# Patient Record
Sex: MALE | Race: WHITE | Employment: OTHER | URBAN - METROPOLITAN AREA
[De-identification: names, ages, dates, MRNs, and addresses within clinical notes are randomized per-mention and may not be internally consistent; named-entity substitution may affect disease eponyms.]

---

## 2017-09-08 ENCOUNTER — LAB ENCOUNTER (OUTPATIENT)
Dept: LAB | Age: 77
End: 2017-09-08
Attending: FAMILY MEDICINE
Payer: MEDICARE

## 2017-09-08 DIAGNOSIS — E78.2 MIXED HYPERLIPIDEMIA: Primary | ICD-10-CM

## 2017-09-08 DIAGNOSIS — I10 ESSENTIAL HYPERTENSION, MALIGNANT: ICD-10-CM

## 2017-09-08 LAB
ALBUMIN SERPL-MCNC: 4 G/DL (ref 3.5–4.8)
ALP LIVER SERPL-CCNC: 100 U/L (ref 45–117)
ALT SERPL-CCNC: 29 U/L (ref 17–63)
AST SERPL-CCNC: 18 U/L (ref 15–41)
BILIRUB SERPL-MCNC: 0.7 MG/DL (ref 0.1–2)
BUN BLD-MCNC: 19 MG/DL (ref 8–20)
CALCIUM BLD-MCNC: 9.4 MG/DL (ref 8.3–10.3)
CHLORIDE: 104 MMOL/L (ref 101–111)
CHOLEST SMN-MCNC: 165 MG/DL (ref ?–200)
CO2: 28 MMOL/L (ref 22–32)
CREAT BLD-MCNC: 0.94 MG/DL (ref 0.7–1.3)
CREAT UR-SCNC: 188 MG/DL
GLUCOSE BLD-MCNC: 77 MG/DL (ref 70–99)
HDLC SERPL-MCNC: 61 MG/DL (ref 45–?)
HDLC SERPL: 2.7 {RATIO} (ref ?–4.97)
LDLC SERPL CALC-MCNC: 94 MG/DL (ref ?–130)
LDLC SERPL-MCNC: 10 MG/DL (ref 5–40)
M PROTEIN MFR SERPL ELPH: 7.4 G/DL (ref 6.1–8.3)
MICROALBUMIN UR-MCNC: 2.33 MG/DL
MICROALBUMIN/CREAT 24H UR-RTO: 12.4 UG/MG (ref ?–30)
NONHDLC SERPL-MCNC: 104 MG/DL (ref ?–130)
POTASSIUM SERPL-SCNC: 4.1 MMOL/L (ref 3.6–5.1)
SODIUM SERPL-SCNC: 138 MMOL/L (ref 136–144)
TRIGLYCERIDES: 52 MG/DL (ref ?–150)

## 2017-09-08 PROCEDURE — 80053 COMPREHEN METABOLIC PANEL: CPT

## 2017-09-08 PROCEDURE — 82043 UR ALBUMIN QUANTITATIVE: CPT

## 2017-09-08 PROCEDURE — 36415 COLL VENOUS BLD VENIPUNCTURE: CPT

## 2017-09-08 PROCEDURE — 82570 ASSAY OF URINE CREATININE: CPT

## 2017-09-08 PROCEDURE — 80061 LIPID PANEL: CPT

## 2023-01-14 ENCOUNTER — OFFICE VISIT (OUTPATIENT)
Dept: URGENT CARE | Age: 83
End: 2023-01-14

## 2023-01-14 DIAGNOSIS — H61.23 BILATERAL IMPACTED CERUMEN: Primary | ICD-10-CM

## 2023-01-14 PROCEDURE — 99203 OFFICE O/P NEW LOW 30 MIN: CPT | Performed by: NURSE PRACTITIONER

## 2023-01-14 RX ORDER — ATORVASTATIN CALCIUM 40 MG/1
40 TABLET, FILM COATED ORAL DAILY
COMMUNITY
Start: 2022-10-17

## 2023-01-14 RX ORDER — ESCITALOPRAM OXALATE 10 MG/1
10 TABLET ORAL DAILY
COMMUNITY
Start: 2022-11-15

## 2023-01-14 RX ORDER — LOSARTAN POTASSIUM 50 MG/1
TABLET ORAL
COMMUNITY
Start: 2022-12-15

## 2023-01-14 RX ORDER — ALPRAZOLAM 0.25 MG/1
TABLET ORAL
COMMUNITY
Start: 2022-08-27

## 2023-01-21 ENCOUNTER — OFFICE VISIT (OUTPATIENT)
Dept: URGENT CARE | Age: 83
End: 2023-01-21

## 2023-01-21 DIAGNOSIS — H61.23 BILATERAL IMPACTED CERUMEN: Primary | ICD-10-CM

## 2023-01-21 PROCEDURE — 69210 REMOVE IMPACTED EAR WAX UNI: CPT | Performed by: NURSE PRACTITIONER

## 2023-01-21 PROCEDURE — 69209 REMOVE IMPACTED EAR WAX UNI: CPT | Performed by: NURSE PRACTITIONER

## 2023-05-30 ENCOUNTER — HOSPITAL ENCOUNTER (EMERGENCY)
Facility: HOSPITAL | Age: 83
Discharge: HOME | End: 2023-05-30
Attending: STUDENT IN AN ORGANIZED HEALTH CARE EDUCATION/TRAINING PROGRAM
Payer: MEDICARE

## 2023-05-30 ENCOUNTER — APPOINTMENT (OUTPATIENT)
Dept: GENERAL RADIOLOGY | Facility: HOSPITAL | Age: 83
End: 2023-05-30
Attending: STUDENT IN AN ORGANIZED HEALTH CARE EDUCATION/TRAINING PROGRAM
Payer: MEDICARE

## 2023-05-30 VITALS
HEART RATE: 54 BPM | BODY MASS INDEX: 23.84 KG/M2 | HEIGHT: 72 IN | RESPIRATION RATE: 15 BRPM | OXYGEN SATURATION: 98 % | DIASTOLIC BLOOD PRESSURE: 60 MMHG | SYSTOLIC BLOOD PRESSURE: 125 MMHG | WEIGHT: 176 LBS | TEMPERATURE: 97 F

## 2023-05-30 DIAGNOSIS — I50.9 CONGESTIVE HEART FAILURE, UNSPECIFIED HF CHRONICITY, UNSPECIFIED HEART FAILURE TYPE (HCC): Primary | ICD-10-CM

## 2023-05-30 LAB
ANION GAP SERPL CALC-SCNC: 5 MMOL/L (ref 0–18)
BASOPHILS # BLD AUTO: 0.02 X10(3) UL (ref 0–0.2)
BASOPHILS NFR BLD AUTO: 0.2 %
BUN BLD-MCNC: 34 MG/DL (ref 7–18)
CALCIUM BLD-MCNC: 8.8 MG/DL (ref 8.5–10.1)
CHLORIDE SERPL-SCNC: 104 MMOL/L (ref 98–112)
CO2 SERPL-SCNC: 28 MMOL/L (ref 21–32)
CREAT BLD-MCNC: 1.09 MG/DL
EOSINOPHIL # BLD AUTO: 0.37 X10(3) UL (ref 0–0.7)
EOSINOPHIL NFR BLD AUTO: 3.1 %
ERYTHROCYTE [DISTWIDTH] IN BLOOD BY AUTOMATED COUNT: 12.6 %
GFR SERPLBLD BASED ON 1.73 SQ M-ARVRAT: 68 ML/MIN/1.73M2 (ref 60–?)
GLUCOSE BLD-MCNC: 97 MG/DL (ref 70–99)
HCT VFR BLD AUTO: 41.1 %
HGB BLD-MCNC: 14 G/DL
IMM GRANULOCYTES # BLD AUTO: 0.04 X10(3) UL (ref 0–1)
IMM GRANULOCYTES NFR BLD: 0.3 %
LYMPHOCYTES # BLD AUTO: 3.1 X10(3) UL (ref 1–4)
LYMPHOCYTES NFR BLD AUTO: 26 %
MCH RBC QN AUTO: 29.3 PG (ref 26–34)
MCHC RBC AUTO-ENTMCNC: 34.1 G/DL (ref 31–37)
MCV RBC AUTO: 86 FL
MONOCYTES # BLD AUTO: 1.02 X10(3) UL (ref 0.1–1)
MONOCYTES NFR BLD AUTO: 8.5 %
NEUTROPHILS # BLD AUTO: 7.39 X10 (3) UL (ref 1.5–7.7)
NEUTROPHILS # BLD AUTO: 7.39 X10(3) UL (ref 1.5–7.7)
NEUTROPHILS NFR BLD AUTO: 61.9 %
NT-PROBNP SERPL-MCNC: 255 PG/ML (ref ?–450)
OSMOLALITY SERPL CALC.SUM OF ELEC: 292 MOSM/KG (ref 275–295)
PLATELET # BLD AUTO: 247 10(3)UL (ref 150–450)
POTASSIUM SERPL-SCNC: 4.1 MMOL/L (ref 3.5–5.1)
RBC # BLD AUTO: 4.78 X10(6)UL
SODIUM SERPL-SCNC: 137 MMOL/L (ref 136–145)
TROPONIN I HIGH SENSITIVITY: 6 NG/L
WBC # BLD AUTO: 11.9 X10(3) UL (ref 4–11)

## 2023-05-30 PROCEDURE — 99284 EMERGENCY DEPT VISIT MOD MDM: CPT

## 2023-05-30 PROCEDURE — 93010 ELECTROCARDIOGRAM REPORT: CPT

## 2023-05-30 PROCEDURE — 36415 COLL VENOUS BLD VENIPUNCTURE: CPT

## 2023-05-30 PROCEDURE — 80048 BASIC METABOLIC PNL TOTAL CA: CPT | Performed by: STUDENT IN AN ORGANIZED HEALTH CARE EDUCATION/TRAINING PROGRAM

## 2023-05-30 PROCEDURE — 84484 ASSAY OF TROPONIN QUANT: CPT | Performed by: STUDENT IN AN ORGANIZED HEALTH CARE EDUCATION/TRAINING PROGRAM

## 2023-05-30 PROCEDURE — 71045 X-RAY EXAM CHEST 1 VIEW: CPT | Performed by: STUDENT IN AN ORGANIZED HEALTH CARE EDUCATION/TRAINING PROGRAM

## 2023-05-30 PROCEDURE — 93005 ELECTROCARDIOGRAM TRACING: CPT

## 2023-05-30 PROCEDURE — 85025 COMPLETE CBC W/AUTO DIFF WBC: CPT | Performed by: STUDENT IN AN ORGANIZED HEALTH CARE EDUCATION/TRAINING PROGRAM

## 2023-05-30 PROCEDURE — 83880 ASSAY OF NATRIURETIC PEPTIDE: CPT | Performed by: STUDENT IN AN ORGANIZED HEALTH CARE EDUCATION/TRAINING PROGRAM

## 2023-05-30 PROCEDURE — 99285 EMERGENCY DEPT VISIT HI MDM: CPT

## 2023-05-30 NOTE — ED INITIAL ASSESSMENT (HPI)
Pt. States he has been having issues breathing with exertion for the past 3 months, has been to a few different doctors with conflicting opinions.  Also c/o swelling and redness to BLE and dizziness

## 2023-05-31 LAB
ATRIAL RATE: 54 BPM
P AXIS: 70 DEGREES
P-R INTERVAL: 192 MS
Q-T INTERVAL: 444 MS
QRS DURATION: 106 MS
QTC CALCULATION (BEZET): 421 MS
R AXIS: -6 DEGREES
T AXIS: 29 DEGREES
VENTRICULAR RATE: 54 BPM

## 2023-05-31 NOTE — ED QUICK NOTES
Rounding Completed    Plan of Care reviewed. Waiting for CXR results. Elimination needs assessed. Provided cup of water per ok from [facility].    Bed is locked and in lowest position. Call light within reach.

## 2024-05-31 ENCOUNTER — APPOINTMENT (OUTPATIENT)
Dept: GENERAL RADIOLOGY | Age: 84
DRG: 956 | End: 2024-05-31
Attending: SURGERY

## 2024-05-31 ENCOUNTER — HOSPITAL ENCOUNTER (INPATIENT)
Age: 84
DRG: 956 | End: 2024-05-31
Attending: SURGERY | Admitting: SURGERY

## 2024-05-31 ENCOUNTER — APPOINTMENT (OUTPATIENT)
Dept: CT IMAGING | Age: 84
DRG: 956 | End: 2024-05-31

## 2024-05-31 ENCOUNTER — APPOINTMENT (OUTPATIENT)
Dept: CT IMAGING | Age: 84
DRG: 956 | End: 2024-05-31
Attending: SURGERY

## 2024-05-31 ENCOUNTER — APPOINTMENT (OUTPATIENT)
Dept: GENERAL RADIOLOGY | Age: 84
DRG: 956 | End: 2024-05-31

## 2024-05-31 ENCOUNTER — ANESTHESIA EVENT (OUTPATIENT)
Dept: SURGERY | Age: 84
End: 2024-05-31

## 2024-05-31 DIAGNOSIS — S72.8X2A OTHER FRACTURE OF LEFT FEMUR, INITIAL ENCOUNTER FOR CLOSED FRACTURE  (CMD): Primary | ICD-10-CM

## 2024-05-31 DIAGNOSIS — I47.10 SVT (SUPRAVENTRICULAR TACHYCARDIA) (CMD): ICD-10-CM
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
## 2024-05-31 LAB
ABO + RH BLD: NORMAL
ABO + RH BLD: NORMAL
ALBUMIN SERPL-MCNC: 3.7 G/DL (ref 3.6–5.1)
ALBUMIN SERPL-MCNC: 3.7 G/DL (ref 3.6–5.1)
ALBUMIN/GLOB SERPL: 1.1 {RATIO} (ref 1–2.4)
ALBUMIN/GLOB SERPL: 1.1 {RATIO} (ref 1–2.4)
ALP SERPL-CCNC: 101 UNITS/L (ref 45–117)
ALP SERPL-CCNC: 101 UNITS/L (ref 45–117)
ALT SERPL-CCNC: 27 UNITS/L
ALT SERPL-CCNC: 27 UNITS/L
AMPHETAMINES UR QL SCN>500 NG/ML: NEGATIVE
AMPHETAMINES UR QL SCN>500 NG/ML: NEGATIVE
AMYLASE SERPL-CCNC: 92 UNITS/L (ref 25–115)
AMYLASE SERPL-CCNC: 92 UNITS/L (ref 25–115)
ANION GAP SERPL CALC-SCNC: 11 MMOL/L (ref 7–19)
ANION GAP SERPL CALC-SCNC: 11 MMOL/L (ref 7–19)
APPEARANCE UR: CLEAR
APPEARANCE UR: CLEAR
APTT PPP: 23 SEC (ref 22–32)
APTT PPP: 23 SEC (ref 22–32)
AST SERPL-CCNC: 31 UNITS/L
AST SERPL-CCNC: 31 UNITS/L
BARBITURATES UR QL SCN>200 NG/ML: NEGATIVE
BARBITURATES UR QL SCN>200 NG/ML: NEGATIVE
BASOPHILS # BLD: 0 K/MCL (ref 0–0.3)
BASOPHILS # BLD: 0 K/MCL (ref 0–0.3)
BASOPHILS NFR BLD: 0 %
BASOPHILS NFR BLD: 0 %
BENZODIAZ UR QL SCN>200 NG/ML: NEGATIVE
BENZODIAZ UR QL SCN>200 NG/ML: NEGATIVE
BILIRUB SERPL-MCNC: 0.7 MG/DL (ref 0.2–1)
BILIRUB SERPL-MCNC: 0.7 MG/DL (ref 0.2–1)
BILIRUB UR QL STRIP: NEGATIVE
BILIRUB UR QL STRIP: NEGATIVE
BLD GP AB SCN SERPL QL GEL: NEGATIVE
BLD GP AB SCN SERPL QL GEL: NEGATIVE
BUN SERPL-MCNC: 27 MG/DL (ref 6–20)
BUN SERPL-MCNC: 27 MG/DL (ref 6–20)
BUN/CREAT SERPL: 23 (ref 7–25)
BUN/CREAT SERPL: 23 (ref 7–25)
BZE UR QL SCN>150 NG/ML: NEGATIVE
BZE UR QL SCN>150 NG/ML: NEGATIVE
CALCIUM SERPL-MCNC: 9 MG/DL (ref 8.4–10.2)
CALCIUM SERPL-MCNC: 9 MG/DL (ref 8.4–10.2)
CANNABINOIDS UR QL SCN>50 NG/ML: NEGATIVE
CANNABINOIDS UR QL SCN>50 NG/ML: NEGATIVE
CHLORIDE SERPL-SCNC: 106 MMOL/L (ref 97–110)
CHLORIDE SERPL-SCNC: 106 MMOL/L (ref 97–110)
CK SERPL-CCNC: 195 UNITS/L (ref 39–308)
CK SERPL-CCNC: 195 UNITS/L (ref 39–308)
CO2 SERPL-SCNC: 24 MMOL/L (ref 21–32)
CO2 SERPL-SCNC: 24 MMOL/L (ref 21–32)
COLOR UR: COLORLESS
COLOR UR: COLORLESS
CREAT SERPL-MCNC: 1.19 MG/DL (ref 0.67–1.17)
CREAT SERPL-MCNC: 1.19 MG/DL (ref 0.67–1.17)
DEPRECATED RDW RBC: 41.7 FL (ref 39–50)
DEPRECATED RDW RBC: 41.7 FL (ref 39–50)
EGFRCR SERPLBLD CKD-EPI 2021: 47 ML/MIN/{1.73_M2}
EGFRCR SERPLBLD CKD-EPI 2021: 47 ML/MIN/{1.73_M2}
EOSINOPHIL # BLD: 0.1 K/MCL (ref 0–0.5)
EOSINOPHIL # BLD: 0.1 K/MCL (ref 0–0.5)
EOSINOPHIL NFR BLD: 1 %
EOSINOPHIL NFR BLD: 1 %
ERYTHROCYTE [DISTWIDTH] IN BLOOD: 12.7 % (ref 11–15)
ERYTHROCYTE [DISTWIDTH] IN BLOOD: 12.7 % (ref 11–15)
ETHANOL SERPL-MCNC: NORMAL MG/DL
ETHANOL SERPL-MCNC: NORMAL MG/DL
FASTING DURATION TIME PATIENT: ABNORMAL H
FASTING DURATION TIME PATIENT: ABNORMAL H
FENTANYL UR QL SCN: NEGATIVE
FENTANYL UR QL SCN: NEGATIVE
GLOBULIN SER-MCNC: 3.5 G/DL (ref 2–4)
GLOBULIN SER-MCNC: 3.5 G/DL (ref 2–4)
GLUCOSE SERPL-MCNC: 108 MG/DL (ref 70–99)
GLUCOSE SERPL-MCNC: 108 MG/DL (ref 70–99)
GLUCOSE UR STRIP-MCNC: NEGATIVE MG/DL
GLUCOSE UR STRIP-MCNC: NEGATIVE MG/DL
HCT VFR BLD CALC: 39.8 % (ref 39–51)
HCT VFR BLD CALC: 39.8 % (ref 39–51)
HGB BLD-MCNC: 13.2 G/DL (ref 13–17)
HGB BLD-MCNC: 13.2 G/DL (ref 13–17)
HGB UR QL STRIP: NEGATIVE
HGB UR QL STRIP: NEGATIVE
IMM GRANULOCYTES # BLD AUTO: 0 K/MCL (ref 0–0.2)
IMM GRANULOCYTES # BLD AUTO: 0 K/MCL (ref 0–0.2)
IMM GRANULOCYTES # BLD: 0 %
IMM GRANULOCYTES # BLD: 0 %
INR PPP: 1
INR PPP: 1
KETONES UR STRIP-MCNC: NEGATIVE MG/DL
KETONES UR STRIP-MCNC: NEGATIVE MG/DL
LEUKOCYTE ESTERASE UR QL STRIP: NEGATIVE
LEUKOCYTE ESTERASE UR QL STRIP: NEGATIVE
LIPASE SERPL-CCNC: 37 UNITS/L (ref 15–77)
LIPASE SERPL-CCNC: 37 UNITS/L (ref 15–77)
LYMPHOCYTES # BLD: 3.9 K/MCL (ref 1–4)
LYMPHOCYTES # BLD: 3.9 K/MCL (ref 1–4)
LYMPHOCYTES NFR BLD: 38 %
LYMPHOCYTES NFR BLD: 38 %
MCH RBC QN AUTO: 29.8 PG (ref 26–34)
MCH RBC QN AUTO: 29.8 PG (ref 26–34)
MCHC RBC AUTO-ENTMCNC: 33.2 G/DL (ref 32–36.5)
MCHC RBC AUTO-ENTMCNC: 33.2 G/DL (ref 32–36.5)
MCV RBC AUTO: 89.8 FL (ref 78–100)
MCV RBC AUTO: 89.8 FL (ref 78–100)
MONOCYTES # BLD: 0.8 K/MCL (ref 0.3–0.9)
MONOCYTES # BLD: 0.8 K/MCL (ref 0.3–0.9)
MONOCYTES NFR BLD: 8 %
MONOCYTES NFR BLD: 8 %
NEUTROPHILS # BLD: 5.5 K/MCL (ref 1.8–7.7)
NEUTROPHILS # BLD: 5.5 K/MCL (ref 1.8–7.7)
NEUTROPHILS NFR BLD: 53 %
NEUTROPHILS NFR BLD: 53 %
NITRITE UR QL STRIP: NEGATIVE
NITRITE UR QL STRIP: NEGATIVE
NRBC BLD MANUAL-RTO: 0 /100 WBC
NRBC BLD MANUAL-RTO: 0 /100 WBC
OPIATES UR QL SCN>300 NG/ML: NEGATIVE
OPIATES UR QL SCN>300 NG/ML: NEGATIVE
PCP UR QL SCN>25 NG/ML: NEGATIVE
PCP UR QL SCN>25 NG/ML: NEGATIVE
PH UR STRIP: 6.5 [PH] (ref 5–7)
PH UR STRIP: 6.5 [PH] (ref 5–7)
PLATELET # BLD AUTO: 274 K/MCL (ref 140–450)
PLATELET # BLD AUTO: 274 K/MCL (ref 140–450)
POTASSIUM SERPL-SCNC: 4.2 MMOL/L (ref 3.4–5.1)
POTASSIUM SERPL-SCNC: 4.2 MMOL/L (ref 3.4–5.1)
PROT SERPL-MCNC: 7.2 G/DL (ref 6.4–8.2)
PROT SERPL-MCNC: 7.2 G/DL (ref 6.4–8.2)
PROT UR STRIP-MCNC: NEGATIVE MG/DL
PROT UR STRIP-MCNC: NEGATIVE MG/DL
PROTHROMBIN TIME: 11 SEC (ref 9.7–11.8)
PROTHROMBIN TIME: 11 SEC (ref 9.7–11.8)
RAINBOW EXTRA TUBES HOLD SPECIMEN: NORMAL
RBC # BLD: 4.43 MIL/MCL (ref 4.5–5.9)
RBC # BLD: 4.43 MIL/MCL (ref 4.5–5.9)
SODIUM SERPL-SCNC: 137 MMOL/L (ref 135–145)
SODIUM SERPL-SCNC: 137 MMOL/L (ref 135–145)
SP GR UR STRIP: 1.02 (ref 1–1.03)
SP GR UR STRIP: 1.02 (ref 1–1.03)
TROPONIN I SERPL DL<=0.01 NG/ML-MCNC: 6 NG/L
TROPONIN I SERPL DL<=0.01 NG/ML-MCNC: 6 NG/L
TYPE AND SCREEN EXPIRATION DATE: NORMAL
TYPE AND SCREEN EXPIRATION DATE: NORMAL
UROBILINOGEN UR STRIP-MCNC: 0.2 MG/DL
UROBILINOGEN UR STRIP-MCNC: 0.2 MG/DL
WBC # BLD: 10.4 K/MCL (ref 4.2–11)
WBC # BLD: 10.4 K/MCL (ref 4.2–11)

## 2024-05-31 PROCEDURE — 10004651 HB RX, NO CHARGE ITEM: Performed by: SURGERY

## 2024-05-31 PROCEDURE — 10006031 HB ROOM CHARGE TELEMETRY

## 2024-05-31 PROCEDURE — 82150 ASSAY OF AMYLASE: CPT

## 2024-05-31 PROCEDURE — 96372 THER/PROPH/DIAG INJ SC/IM: CPT

## 2024-05-31 PROCEDURE — 10002800 HB RX 250 W HCPCS

## 2024-05-31 PROCEDURE — 12001 RPR S/N/AX/GEN/TRNK 2.5CM/<: CPT | Performed by: EMERGENCY MEDICINE

## 2024-05-31 PROCEDURE — 80307 DRUG TEST PRSMV CHEM ANLYZR: CPT

## 2024-05-31 PROCEDURE — 73700 CT LOWER EXTREMITY W/O DYE: CPT

## 2024-05-31 PROCEDURE — 83690 ASSAY OF LIPASE: CPT

## 2024-05-31 PROCEDURE — 10003579 HB TRAUMA W/O CRITICAL CARE

## 2024-05-31 PROCEDURE — 73551 X-RAY EXAM OF FEMUR 1: CPT

## 2024-05-31 PROCEDURE — 82550 ASSAY OF CK (CPK): CPT

## 2024-05-31 PROCEDURE — 96375 TX/PRO/DX INJ NEW DRUG ADDON: CPT

## 2024-05-31 PROCEDURE — 85610 PROTHROMBIN TIME: CPT

## 2024-05-31 PROCEDURE — 13001086 HB INCENTIVE SPIROMETER W INSTRUCT

## 2024-05-31 PROCEDURE — 90715 TDAP VACCINE 7 YRS/> IM: CPT | Performed by: SURGERY

## 2024-05-31 PROCEDURE — 71045 X-RAY EXAM CHEST 1 VIEW: CPT

## 2024-05-31 PROCEDURE — 99291 CRITICAL CARE FIRST HOUR: CPT | Performed by: SURGERY

## 2024-05-31 PROCEDURE — 96374 THER/PROPH/DIAG INJ IV PUSH: CPT

## 2024-05-31 PROCEDURE — 74177 CT ABD & PELVIS W/CONTRAST: CPT

## 2024-05-31 PROCEDURE — 10002805 HB CONTRAST AGENT

## 2024-05-31 PROCEDURE — 99285 EMERGENCY DEPT VISIT HI MDM: CPT

## 2024-05-31 PROCEDURE — 10002803 HB RX 637: Performed by: SURGERY

## 2024-05-31 PROCEDURE — 72125 CT NECK SPINE W/O DYE: CPT

## 2024-05-31 PROCEDURE — 10002800 HB RX 250 W HCPCS: Performed by: SURGERY

## 2024-05-31 PROCEDURE — 86900 BLOOD TYPING SEROLOGIC ABO: CPT | Performed by: PATHOLOGY

## 2024-05-31 PROCEDURE — 10005281 CT THORACIC SPINE 2D REFORMATTED

## 2024-05-31 PROCEDURE — 99222 1ST HOSP IP/OBS MODERATE 55: CPT | Performed by: SURGERY

## 2024-05-31 PROCEDURE — 81003 URINALYSIS AUTO W/O SCOPE: CPT | Performed by: SURGERY

## 2024-05-31 PROCEDURE — 10002016 HB COUNTER INCENTIVE SPIROMETRY

## 2024-05-31 PROCEDURE — 85025 COMPLETE CBC W/AUTO DIFF WBC: CPT

## 2024-05-31 PROCEDURE — 70450 CT HEAD/BRAIN W/O DYE: CPT

## 2024-05-31 PROCEDURE — 10002801 HB RX 250 W/O HCPCS

## 2024-05-31 PROCEDURE — 84484 ASSAY OF TROPONIN QUANT: CPT

## 2024-05-31 PROCEDURE — 86850 RBC ANTIBODY SCREEN: CPT | Performed by: PATHOLOGY

## 2024-05-31 PROCEDURE — 82077 ASSAY SPEC XCP UR&BREATH IA: CPT

## 2024-05-31 PROCEDURE — 85730 THROMBOPLASTIN TIME PARTIAL: CPT

## 2024-05-31 PROCEDURE — 73560 X-RAY EXAM OF KNEE 1 OR 2: CPT

## 2024-05-31 PROCEDURE — 10005281 CT LUMBAR SPINE 2D REFORMATTED

## 2024-05-31 PROCEDURE — 80053 COMPREHEN METABOLIC PANEL: CPT

## 2024-05-31 PROCEDURE — 90471 IMMUNIZATION ADMIN: CPT | Performed by: SURGERY

## 2024-05-31 PROCEDURE — 71260 CT THORAX DX C+: CPT

## 2024-05-31 PROCEDURE — 99223 1ST HOSP IP/OBS HIGH 75: CPT | Performed by: ORTHOPAEDIC SURGERY

## 2024-05-31 PROCEDURE — 0HQ0XZZ REPAIR SCALP SKIN, EXTERNAL APPROACH: ICD-10-PCS | Performed by: EMERGENCY MEDICINE

## 2024-05-31 PROCEDURE — 10004180 HB COUNTER-TRANSPORT

## 2024-05-31 RX ORDER — ONDANSETRON 2 MG/ML
INJECTION INTRAMUSCULAR; INTRAVENOUS
Status: COMPLETED
Start: 2024-05-31 | End: 2024-05-31

## 2024-05-31 RX ORDER — IRBESARTAN 300 MG/1
300 TABLET ORAL NIGHTLY
Status: ON HOLD | COMMUNITY
End: 2024-06-02

## 2024-05-31 RX ORDER — FUROSEMIDE 20 MG/1
20 TABLET ORAL DAILY
Status: ON HOLD | COMMUNITY
End: 2024-06-02

## 2024-05-31 RX ORDER — ENOXAPARIN SODIUM 100 MG/ML
30 INJECTION SUBCUTANEOUS EVERY 24 HOURS
Status: DISCONTINUED | OUTPATIENT
Start: 2024-05-31 | End: 2024-06-01

## 2024-05-31 RX ORDER — CYCLOBENZAPRINE HCL 5 MG
5 TABLET ORAL 3 TIMES DAILY PRN
Status: DISCONTINUED | OUTPATIENT
Start: 2024-05-31 | End: 2024-06-01

## 2024-05-31 RX ORDER — ROSUVASTATIN CALCIUM 10 MG/1
10 TABLET, COATED ORAL DAILY
Status: ON HOLD | COMMUNITY
End: 2024-06-02

## 2024-05-31 RX ORDER — ONDANSETRON 2 MG/ML
4 INJECTION INTRAMUSCULAR; INTRAVENOUS EVERY 6 HOURS PRN
Status: DISCONTINUED | OUTPATIENT
Start: 2024-05-31 | End: 2024-06-05 | Stop reason: HOSPADM

## 2024-05-31 RX ORDER — CLONAZEPAM 1 MG/1
1 TABLET ORAL 2 TIMES DAILY PRN
Status: ON HOLD | COMMUNITY
End: 2024-06-02

## 2024-05-31 RX ORDER — NYSTATIN 100000 [USP'U]/G
POWDER TOPICAL 2 TIMES DAILY
Status: ON HOLD | COMMUNITY
End: 2024-06-02

## 2024-05-31 RX ORDER — ACETAMINOPHEN 500 MG
1000 TABLET ORAL EVERY 8 HOURS
Status: DISCONTINUED | OUTPATIENT
Start: 2024-05-31 | End: 2024-06-05 | Stop reason: HOSPADM

## 2024-05-31 RX ORDER — ONDANSETRON 4 MG/1
4 TABLET, ORALLY DISINTEGRATING ORAL EVERY 6 HOURS PRN
Status: DISCONTINUED | OUTPATIENT
Start: 2024-05-31 | End: 2024-06-05 | Stop reason: HOSPADM

## 2024-05-31 RX ORDER — SODIUM CHLORIDE, SODIUM LACTATE, POTASSIUM CHLORIDE, CALCIUM CHLORIDE 600; 310; 30; 20 MG/100ML; MG/100ML; MG/100ML; MG/100ML
INJECTION, SOLUTION INTRAVENOUS CONTINUOUS
Status: DISCONTINUED | OUTPATIENT
Start: 2024-05-31 | End: 2024-06-01 | Stop reason: HOSPADM

## 2024-05-31 RX ORDER — LEVOTHYROXINE SODIUM 0.03 MG/1
25 TABLET ORAL DAILY
Status: ON HOLD | COMMUNITY
End: 2024-06-02

## 2024-05-31 RX ORDER — SPIRONOLACTONE 25 MG/1
25 TABLET ORAL DAILY
Status: ON HOLD | COMMUNITY
End: 2024-05-31

## 2024-05-31 RX ORDER — TRAMADOL HYDROCHLORIDE 50 MG/1
50 TABLET ORAL EVERY 6 HOURS PRN
Status: ON HOLD | COMMUNITY
End: 2024-06-02

## 2024-05-31 RX ORDER — OXYCODONE HYDROCHLORIDE 5 MG/1
5 TABLET ORAL EVERY 4 HOURS PRN
Status: DISCONTINUED | OUTPATIENT
Start: 2024-05-31 | End: 2024-06-02

## 2024-05-31 RX ORDER — POTASSIUM CHLORIDE 10 MEQ
10 TABLET, EXT RELEASE, PARTICLES/CRYSTALS ORAL 2 TIMES DAILY
Status: ON HOLD | COMMUNITY
End: 2024-06-02

## 2024-05-31 RX ORDER — CETIRIZINE HYDROCHLORIDE 10 MG/1
10 TABLET ORAL DAILY
COMMUNITY

## 2024-05-31 RX ORDER — 0.9 % SODIUM CHLORIDE 0.9 %
2 VIAL (ML) INJECTION EVERY 12 HOURS SCHEDULED
Status: DISCONTINUED | OUTPATIENT
Start: 2024-05-31 | End: 2024-06-05 | Stop reason: HOSPADM

## 2024-05-31 RX ORDER — GABAPENTIN 100 MG/1
100 CAPSULE ORAL 3 TIMES DAILY
Status: DISCONTINUED | OUTPATIENT
Start: 2024-05-31 | End: 2024-06-02

## 2024-05-31 RX ADMIN — OXYCODONE 5 MG: 5 TABLET ORAL at 14:52

## 2024-05-31 RX ADMIN — GABAPENTIN 100 MG: 100 CAPSULE ORAL at 21:19

## 2024-05-31 RX ADMIN — FENTANYL CITRATE 50 MCG: 50 INJECTION INTRAMUSCULAR; INTRAVENOUS at 11:08

## 2024-05-31 RX ADMIN — MORPHINE SULFATE 2 MG: 2 INJECTION, SOLUTION INTRAMUSCULAR; INTRAVENOUS at 12:58

## 2024-05-31 RX ADMIN — TETANUS TOXOID, REDUCED DIPHTHERIA TOXOID AND ACELLULAR PERTUSSIS VACCINE, ADSORBED 0.5 ML: 5; 2.5; 8; 8; 2.5 SUSPENSION INTRAMUSCULAR at 11:45

## 2024-05-31 RX ADMIN — MORPHINE SULFATE 2 MG: 2 INJECTION, SOLUTION INTRAMUSCULAR; INTRAVENOUS at 17:01

## 2024-05-31 RX ADMIN — OXYCODONE 5 MG: 5 TABLET ORAL at 20:06

## 2024-05-31 RX ADMIN — ACETAMINOPHEN 1000 MG: 500 TABLET ORAL at 14:53

## 2024-05-31 RX ADMIN — ACETAMINOPHEN 1000 MG: 500 TABLET ORAL at 21:19

## 2024-05-31 RX ADMIN — IOHEXOL 85 ML: 350 INJECTION, SOLUTION INTRAVENOUS at 11:35

## 2024-05-31 RX ADMIN — FENTANYL CITRATE 50 MCG: 50 INJECTION INTRAMUSCULAR; INTRAVENOUS at 11:30

## 2024-05-31 RX ADMIN — CEFAZOLIN SODIUM 2000 MG: 300 INJECTION, POWDER, LYOPHILIZED, FOR SOLUTION INTRAVENOUS at 11:45

## 2024-05-31 RX ADMIN — ONDANSETRON 4 MG: 2 INJECTION INTRAMUSCULAR; INTRAVENOUS at 11:20

## 2024-05-31 RX ADMIN — SODIUM CHLORIDE, PRESERVATIVE FREE 2 ML: 5 INJECTION INTRAVENOUS at 14:54

## 2024-05-31 RX ADMIN — ENOXAPARIN SODIUM 30 MG: 100 INJECTION SUBCUTANEOUS at 17:02

## 2024-05-31 RX ADMIN — GABAPENTIN 100 MG: 100 CAPSULE ORAL at 14:54

## 2024-05-31 RX ADMIN — SODIUM CHLORIDE, PRESERVATIVE FREE 2 ML: 5 INJECTION INTRAVENOUS at 21:20

## 2024-05-31 SDOH — HEALTH STABILITY: PHYSICAL HEALTH: DO YOU HAVE SERIOUS DIFFICULTY WALKING OR CLIMBING STAIRS?: YES

## 2024-05-31 SDOH — ECONOMIC STABILITY: HOUSING INSECURITY: WHAT IS YOUR LIVING SITUATION TODAY?: I HAVE A STEADY PLACE TO LIVE

## 2024-05-31 SDOH — ECONOMIC STABILITY: INCOME INSECURITY: IN THE PAST 12 MONTHS, HAS THE ELECTRIC, GAS, OIL, OR WATER COMPANY THREATENED TO SHUT OFF SERVICE IN YOUR HOME?: NO

## 2024-05-31 SDOH — ECONOMIC STABILITY: FOOD INSECURITY: WITHIN THE PAST 12 MONTHS, THE FOOD YOU BOUGHT JUST DIDN'T LAST AND YOU DIDN'T HAVE MONEY TO GET MORE.: NEVER TRUE

## 2024-05-31 SDOH — HEALTH STABILITY: PHYSICAL HEALTH: DO YOU HAVE DIFFICULTY DRESSING OR BATHING?: NO

## 2024-05-31 SDOH — SOCIAL STABILITY: SOCIAL INSECURITY: HOW OFTEN DOES ANYONE, INCLUDING FAMILY AND FRIENDS, THREATEN YOU WITH HARM?: NEVER

## 2024-05-31 SDOH — SOCIAL STABILITY: SOCIAL INSECURITY: HOW OFTEN DOES ANYONE, INCLUDING FAMILY AND FRIENDS, SCREAM OR CURSE AT YOU?: NEVER

## 2024-05-31 SDOH — SOCIAL STABILITY: SOCIAL INSECURITY: HOW OFTEN DOES ANYONE, INCLUDING FAMILY AND FRIENDS, PHYSICALLY HURT YOU?: NEVER

## 2024-05-31 SDOH — ECONOMIC STABILITY: TRANSPORTATION INSECURITY
IN THE PAST 12 MONTHS, HAS LACK OF RELIABLE TRANSPORTATION KEPT YOU FROM MEDICAL APPOINTMENTS, MEETINGS, WORK OR FROM GETTING THINGS NEEDED FOR DAILY LIVING?: NO

## 2024-05-31 SDOH — SOCIAL STABILITY: SOCIAL NETWORK: SUPPORT SYSTEMS: FAMILY MEMBERS

## 2024-05-31 SDOH — ECONOMIC STABILITY: GENERAL: WOULD YOU LIKE HELP WITH ANY OF THE FOLLOWING NEEDS?: I DON'T WANT HELP WITH ANY OF THESE

## 2024-05-31 SDOH — SOCIAL STABILITY: SOCIAL NETWORK
HOW OFTEN DO YOU SEE OR TALK TO PEOPLE THAT YOU CARE ABOUT AND FEEL CLOSE TO? (FOR EXAMPLE: TALKING TO FRIENDS ON THE PHONE, VISITING FRIENDS OR FAMILY, GOING TO CHURCH OR CLUB MEETINGS): 5 OR MORE TIMES A WEEK

## 2024-05-31 SDOH — ECONOMIC STABILITY: HOUSING INSECURITY: DO YOU HAVE PROBLEMS WITH ANY OF THE FOLLOWING?: NONE OF THE ABOVE

## 2024-05-31 SDOH — SOCIAL STABILITY: SOCIAL INSECURITY: HOW OFTEN DOES ANYONE, INCLUDING FAMILY AND FRIENDS, INSULT OR TALK DOWN TO YOU?: NEVER

## 2024-05-31 SDOH — HEALTH STABILITY: GENERAL: BECAUSE OF A PHYSICAL, MENTAL, OR EMOTIONAL CONDITION, DO YOU HAVE DIFFICULTY DOING ERRANDS ALONE?: NO

## 2024-05-31 SDOH — HEALTH STABILITY: GENERAL
BECAUSE OF A PHYSICAL, MENTAL, OR EMOTIONAL CONDITION, DO YOU HAVE SERIOUS DIFFICULTY CONCENTRATING, REMEMBERING OR MAKING DECISIONS?: NO

## 2024-05-31 SDOH — ECONOMIC STABILITY: GENERAL

## 2024-05-31 SDOH — ECONOMIC STABILITY: HOUSING INSECURITY: WHAT IS YOUR LIVING SITUATION TODAY?: SPOUSE

## 2024-05-31 SDOH — ECONOMIC STABILITY: HOUSING INSECURITY: WHAT IS YOUR LIVING SITUATION TODAY?: HOUSE

## 2024-05-31 ASSESSMENT — PAIN SCALES - GENERAL
PAINLEVEL_OUTOF10: 8
PAINLEVEL_OUTOF10: 5
PAINLEVEL_OUTOF10: 7
PAINLEVEL_OUTOF10: 8
PAINLEVEL_OUTOF10: 5
PAINLEVEL_OUTOF10: 8
PAINLEVEL_OUTOF10: 8
PAINLEVEL_OUTOF10: 6
PAINLEVEL_OUTOF10: 6

## 2024-05-31 ASSESSMENT — ORIENTATION MEMORY CONCENTRATION TEST (OMCT)
WHAT YEAR IS IT NOW (MUST BE EXACT): CORRECT
OMCT INTERPRETATION: 0-6: NO SIGNIFICANT IMPAIRMENT
WHAT MONTH IS IT NOW: CORRECT
REPEAT THE NAME AND ADDRESS I ASKED YOU TO REMEMBER: CORRECT
SAY THE MONTHS IN REVERSE ORDER STARTING WITH LAST MONTH: CORRECT
WHAT TIME IS IT (NO WATCH OR CLOCK): CORRECT
OMCT SCORE: 0
COUNT BACKWARDS FROM 20 TO 1: CORRECT

## 2024-05-31 ASSESSMENT — COLUMBIA-SUICIDE SEVERITY RATING SCALE - C-SSRS
1. WITHIN THE PAST MONTH, HAVE YOU WISHED YOU WERE DEAD OR WISHED YOU COULD GO TO SLEEP AND NOT WAKE UP?: NO
6. HAVE YOU EVER DONE ANYTHING, STARTED TO DO ANYTHING, OR PREPARED TO DO ANYTHING TO END YOUR LIFE?: NO
IS THE PATIENT ABLE TO COMPLETE C-SSRS: YES
2. HAVE YOU ACTUALLY HAD ANY THOUGHTS OF KILLING YOURSELF?: NO

## 2024-05-31 ASSESSMENT — ACTIVITIES OF DAILY LIVING (ADL)
ADL_SCORE: 12
ADL_SHORT_OF_BREATH: NO
ADL_BEFORE_ADMISSION: INDEPENDENT
RECENT_DECLINE_ADL: NO

## 2024-05-31 ASSESSMENT — PATIENT HEALTH QUESTIONNAIRE - PHQ9
SUM OF ALL RESPONSES TO PHQ9 QUESTIONS 1 AND 2: 0
IS PATIENT ABLE TO COMPLETE PHQ2 OR PHQ9: YES
1. LITTLE INTEREST OR PLEASURE IN DOING THINGS: NOT AT ALL
CLINICAL INTERPRETATION OF PHQ2 SCORE: NO FURTHER SCREENING NEEDED
2. FEELING DOWN, DEPRESSED OR HOPELESS: NOT AT ALL
SUM OF ALL RESPONSES TO PHQ9 QUESTIONS 1 AND 2: 0

## 2024-06-01 ENCOUNTER — APPOINTMENT (OUTPATIENT)
Dept: GENERAL RADIOLOGY | Age: 84
DRG: 956 | End: 2024-06-01
Attending: ORTHOPAEDIC SURGERY

## 2024-06-01 ENCOUNTER — ANESTHESIA (OUTPATIENT)
Dept: SURGERY | Age: 84
End: 2024-06-01

## 2024-06-01 ENCOUNTER — APPOINTMENT (OUTPATIENT)
Dept: CARDIOLOGY | Age: 84
DRG: 956 | End: 2024-06-01
Attending: INTERNAL MEDICINE

## 2024-06-01 LAB
ANION GAP SERPL CALC-SCNC: 7 MMOL/L (ref 7–19)
ANION GAP SERPL CALC-SCNC: 7 MMOL/L (ref 7–19)
ANION GAP SERPL CALC-SCNC: 9 MMOL/L (ref 7–19)
ANION GAP SERPL CALC-SCNC: 9 MMOL/L (ref 7–19)
AORTIC VALVE AREA (AVA): 0.58
AORTIC VALVE AREA (AVA): 0.58
ASCENDING AORTA (AAD): 3
ASCENDING AORTA (AAD): 3
ATRIAL RATE (BPM): 70
ATRIAL RATE (BPM): 70
ATRIAL RATE (BPM): 77
ATRIAL RATE (BPM): 77
AV STENOSIS SEVERITY TEXT: NORMAL
AV STENOSIS SEVERITY TEXT: NORMAL
AVI LVOT PEAK GRADIENT (LVOTMG): 0.8
AVI LVOT PEAK GRADIENT (LVOTMG): 0.8
BASOPHILS # BLD: 0 K/MCL (ref 0–0.3)
BASOPHILS NFR BLD: 0 %
BUN SERPL-MCNC: 23 MG/DL (ref 6–20)
BUN SERPL-MCNC: 23 MG/DL (ref 6–20)
BUN SERPL-MCNC: 26 MG/DL (ref 6–20)
BUN SERPL-MCNC: 26 MG/DL (ref 6–20)
BUN/CREAT SERPL: 19 (ref 7–25)
BUN/CREAT SERPL: 19 (ref 7–25)
BUN/CREAT SERPL: 20 (ref 7–25)
BUN/CREAT SERPL: 20 (ref 7–25)
CALCIUM SERPL-MCNC: 8.8 MG/DL (ref 8.4–10.2)
CALCIUM SERPL-MCNC: 8.8 MG/DL (ref 8.4–10.2)
CALCIUM SERPL-MCNC: 8.9 MG/DL (ref 8.4–10.2)
CALCIUM SERPL-MCNC: 8.9 MG/DL (ref 8.4–10.2)
CHLORIDE SERPL-SCNC: 103 MMOL/L (ref 97–110)
CO2 SERPL-SCNC: 29 MMOL/L (ref 21–32)
CO2 SERPL-SCNC: 29 MMOL/L (ref 21–32)
CO2 SERPL-SCNC: 30 MMOL/L (ref 21–32)
CO2 SERPL-SCNC: 30 MMOL/L (ref 21–32)
CREAT SERPL-MCNC: 1.18 MG/DL (ref 0.67–1.17)
CREAT SERPL-MCNC: 1.18 MG/DL (ref 0.67–1.17)
CREAT SERPL-MCNC: 1.28 MG/DL (ref 0.67–1.17)
CREAT SERPL-MCNC: 1.28 MG/DL (ref 0.67–1.17)
DEPRECATED RDW RBC: 42.8 FL (ref 39–50)
DEPRECATED RDW RBC: 42.8 FL (ref 39–50)
DEPRECATED RDW RBC: 43.4 FL (ref 39–50)
DEPRECATED RDW RBC: 43.4 FL (ref 39–50)
E WAVE DECELARATION TIME (MDT): 9.4
E WAVE DECELARATION TIME (MDT): 9.4
EGFRCR SERPLBLD CKD-EPI 2021: 56 ML/MIN/{1.73_M2}
EGFRCR SERPLBLD CKD-EPI 2021: 56 ML/MIN/{1.73_M2}
EGFRCR SERPLBLD CKD-EPI 2021: 62 ML/MIN/{1.73_M2}
EGFRCR SERPLBLD CKD-EPI 2021: 62 ML/MIN/{1.73_M2}
EOSINOPHIL # BLD: 0 K/MCL (ref 0–0.5)
EOSINOPHIL # BLD: 0 K/MCL (ref 0–0.5)
EOSINOPHIL # BLD: 0.1 K/MCL (ref 0–0.5)
EOSINOPHIL # BLD: 0.1 K/MCL (ref 0–0.5)
EOSINOPHIL NFR BLD: 0 %
ERYTHROCYTE [DISTWIDTH] IN BLOOD: 12.9 % (ref 11–15)
ERYTHROCYTE [DISTWIDTH] IN BLOOD: 12.9 % (ref 11–15)
ERYTHROCYTE [DISTWIDTH] IN BLOOD: 13 % (ref 11–15)
ERYTHROCYTE [DISTWIDTH] IN BLOOD: 13 % (ref 11–15)
FASTING DURATION TIME PATIENT: ABNORMAL H
GLUCOSE SERPL-MCNC: 132 MG/DL (ref 70–99)
GLUCOSE SERPL-MCNC: 132 MG/DL (ref 70–99)
GLUCOSE SERPL-MCNC: 159 MG/DL (ref 70–99)
GLUCOSE SERPL-MCNC: 159 MG/DL (ref 70–99)
HCT VFR BLD CALC: 31.9 % (ref 39–51)
HCT VFR BLD CALC: 31.9 % (ref 39–51)
HCT VFR BLD CALC: 37.5 % (ref 39–51)
HCT VFR BLD CALC: 37.5 % (ref 39–51)
HGB BLD-MCNC: 10.4 G/DL (ref 13–17)
HGB BLD-MCNC: 10.4 G/DL (ref 13–17)
HGB BLD-MCNC: 12 G/DL (ref 13–17)
HGB BLD-MCNC: 12 G/DL (ref 13–17)
IMM GRANULOCYTES # BLD AUTO: 0.1 K/MCL (ref 0–0.2)
IMM GRANULOCYTES # BLD: 0 %
INTERVENTRICULAR SEPTUM IN END DIASTOLE (IVSD): 2.08
INTERVENTRICULAR SEPTUM IN END DIASTOLE (IVSD): 2.08
LEFT INTERNAL DIMENSION IN SYSTOLE (LVSD): 1
LEFT INTERNAL DIMENSION IN SYSTOLE (LVSD): 1
LEFT VENTRICULAR INTERNAL DIMENSION IN DIASTOLE (LVDD): 3.3
LEFT VENTRICULAR INTERNAL DIMENSION IN DIASTOLE (LVDD): 3.3
LEFT VENTRICULAR POSTERIOR WALL IN END DIASTOLE (LVPW): 4.8
LEFT VENTRICULAR POSTERIOR WALL IN END DIASTOLE (LVPW): 4.8
LV EF: NORMAL %
LV EF: NORMAL %
LVOT 2D (LVOTD): 23.9
LVOT 2D (LVOTD): 23.9
LVOT VTI (LVOTVTI): 1.23
LVOT VTI (LVOTVTI): 1.23
LYMPHOCYTES # BLD: 1 K/MCL (ref 1–4)
LYMPHOCYTES # BLD: 1 K/MCL (ref 1–4)
LYMPHOCYTES # BLD: 1.2 K/MCL (ref 1–4)
LYMPHOCYTES # BLD: 1.2 K/MCL (ref 1–4)
LYMPHOCYTES NFR BLD: 6 %
LYMPHOCYTES NFR BLD: 6 %
LYMPHOCYTES NFR BLD: 7 %
LYMPHOCYTES NFR BLD: 7 %
MAGNESIUM SERPL-MCNC: 2.1 MG/DL (ref 1.7–2.4)
MAGNESIUM SERPL-MCNC: 2.1 MG/DL (ref 1.7–2.4)
MCH RBC QN AUTO: 29.3 PG (ref 26–34)
MCH RBC QN AUTO: 29.3 PG (ref 26–34)
MCH RBC QN AUTO: 29.9 PG (ref 26–34)
MCH RBC QN AUTO: 29.9 PG (ref 26–34)
MCHC RBC AUTO-ENTMCNC: 32 G/DL (ref 32–36.5)
MCHC RBC AUTO-ENTMCNC: 32 G/DL (ref 32–36.5)
MCHC RBC AUTO-ENTMCNC: 32.6 G/DL (ref 32–36.5)
MCHC RBC AUTO-ENTMCNC: 32.6 G/DL (ref 32–36.5)
MCV RBC AUTO: 91.7 FL (ref 78–100)
MONOCYTES # BLD: 1 K/MCL (ref 0.3–0.9)
MONOCYTES # BLD: 1 K/MCL (ref 0.3–0.9)
MONOCYTES # BLD: 1.1 K/MCL (ref 0.3–0.9)
MONOCYTES # BLD: 1.1 K/MCL (ref 0.3–0.9)
MONOCYTES NFR BLD: 6 %
MONOCYTES NFR BLD: 6 %
MONOCYTES NFR BLD: 7 %
MONOCYTES NFR BLD: 7 %
MV E TISSUE VEL MED (MESV): 8.59
MV E TISSUE VEL MED (MESV): 8.59
MV E WAVE VEL/E TISSUE VEL MED(MSR): 6.2
MV E WAVE VEL/E TISSUE VEL MED(MSR): 6.2
MV PEAK A VELOCITY (MVPAV): 239
MV PEAK A VELOCITY (MVPAV): 239
MV PEAK E VELOCITY (MVPEV): 0.67
MV PEAK E VELOCITY (MVPEV): 0.67
NEUTROPHILS # BLD: 13.5 K/MCL (ref 1.8–7.7)
NEUTROPHILS # BLD: 13.5 K/MCL (ref 1.8–7.7)
NEUTROPHILS # BLD: 14.2 K/MCL (ref 1.8–7.7)
NEUTROPHILS # BLD: 14.2 K/MCL (ref 1.8–7.7)
NEUTROPHILS NFR BLD: 86 %
NEUTROPHILS NFR BLD: 86 %
NEUTROPHILS NFR BLD: 88 %
NEUTROPHILS NFR BLD: 88 %
NRBC BLD MANUAL-RTO: 0 /100 WBC
P AXIS (DEGREES): 45
P AXIS (DEGREES): 45
P AXIS (DEGREES): 60
P AXIS (DEGREES): 60
PHOSPHATE SERPL-MCNC: 1.8 MG/DL (ref 2.4–4.7)
PHOSPHATE SERPL-MCNC: 1.8 MG/DL (ref 2.4–4.7)
PLATELET # BLD AUTO: 185 K/MCL (ref 140–450)
PLATELET # BLD AUTO: 185 K/MCL (ref 140–450)
PLATELET # BLD AUTO: 241 K/MCL (ref 140–450)
PLATELET # BLD AUTO: 241 K/MCL (ref 140–450)
POTASSIUM SERPL-SCNC: 3.9 MMOL/L (ref 3.4–5.1)
POTASSIUM SERPL-SCNC: 3.9 MMOL/L (ref 3.4–5.1)
POTASSIUM SERPL-SCNC: 4 MMOL/L (ref 3.4–5.1)
POTASSIUM SERPL-SCNC: 4 MMOL/L (ref 3.4–5.1)
PR-INTERVAL (MSEC): 160
PR-INTERVAL (MSEC): 160
PR-INTERVAL (MSEC): 172
PR-INTERVAL (MSEC): 172
QRS-INTERVAL (MSEC): 90
QRS-INTERVAL (MSEC): 90
QRS-INTERVAL (MSEC): 96
QRS-INTERVAL (MSEC): 96
QT-INTERVAL (MSEC): 384
QT-INTERVAL (MSEC): 384
QT-INTERVAL (MSEC): 396
QT-INTERVAL (MSEC): 396
QTC: 427
QTC: 427
QTC: 435
QTC: 435
R AXIS (DEGREES): 10
RBC # BLD: 3.48 MIL/MCL (ref 4.5–5.9)
RBC # BLD: 3.48 MIL/MCL (ref 4.5–5.9)
RBC # BLD: 4.09 MIL/MCL (ref 4.5–5.9)
RBC # BLD: 4.09 MIL/MCL (ref 4.5–5.9)
REPORT TEXT: NORMAL
RV END SYSTOLIC LONGITUDINAL STRAIN FREE WALL (RVGS): 2.2
RV END SYSTOLIC LONGITUDINAL STRAIN FREE WALL (RVGS): 2.2
SODIUM SERPL-SCNC: 136 MMOL/L (ref 135–145)
SODIUM SERPL-SCNC: 136 MMOL/L (ref 135–145)
SODIUM SERPL-SCNC: 137 MMOL/L (ref 135–145)
SODIUM SERPL-SCNC: 137 MMOL/L (ref 135–145)
T AXIS (DEGREES): -27
T AXIS (DEGREES): -27
T AXIS (DEGREES): -68
T AXIS (DEGREES): -68
TRICUSPID VALVE PEAK REGURGITATION VELOCITY (TRPV): 4.2
TRICUSPID VALVE PEAK REGURGITATION VELOCITY (TRPV): 4.2
TROPONIN I SERPL DL<=0.01 NG/ML-MCNC: 157 NG/L
TROPONIN I SERPL DL<=0.01 NG/ML-MCNC: 157 NG/L
TV ESTIMATED RIGHT ARTERIAL PRESSURE (RAP): 13.4
TV ESTIMATED RIGHT ARTERIAL PRESSURE (RAP): 13.4
VENTRICULAR RATE EKG/MIN (BPM): 70
VENTRICULAR RATE EKG/MIN (BPM): 70
VENTRICULAR RATE EKG/MIN (BPM): 77
VENTRICULAR RATE EKG/MIN (BPM): 77
WBC # BLD: 15.9 K/MCL (ref 4.2–11)
WBC # BLD: 15.9 K/MCL (ref 4.2–11)
WBC # BLD: 16.4 K/MCL (ref 4.2–11)
WBC # BLD: 16.4 K/MCL (ref 4.2–11)

## 2024-06-01 PROCEDURE — 84484 ASSAY OF TROPONIN QUANT: CPT | Performed by: STUDENT IN AN ORGANIZED HEALTH CARE EDUCATION/TRAINING PROGRAM

## 2024-06-01 PROCEDURE — 80048 BASIC METABOLIC PNL TOTAL CA: CPT | Performed by: STUDENT IN AN ORGANIZED HEALTH CARE EDUCATION/TRAINING PROGRAM

## 2024-06-01 PROCEDURE — 27513 TREATMENT OF THIGH FRACTURE: CPT | Performed by: ORTHOPAEDIC SURGERY

## 2024-06-01 PROCEDURE — 93005 ELECTROCARDIOGRAM TRACING: CPT | Performed by: ORTHOPAEDIC SURGERY

## 2024-06-01 PROCEDURE — 10002800 HB RX 250 W HCPCS: Performed by: ANESTHESIOLOGY

## 2024-06-01 PROCEDURE — 10004452 HB PACU ADDL 30 MINUTES: Performed by: ORTHOPAEDIC SURGERY

## 2024-06-01 PROCEDURE — 10002803 HB RX 637: Performed by: SURGERY

## 2024-06-01 PROCEDURE — 10006031 HB ROOM CHARGE TELEMETRY

## 2024-06-01 PROCEDURE — C1769 GUIDE WIRE: HCPCS | Performed by: ORTHOPAEDIC SURGERY

## 2024-06-01 PROCEDURE — 0QSC04Z REPOSITION LEFT LOWER FEMUR WITH INTERNAL FIXATION DEVICE, OPEN APPROACH: ICD-10-PCS | Performed by: ORTHOPAEDIC SURGERY

## 2024-06-01 PROCEDURE — 13000002 HB ANESTHESIA  GENERAL  S/U + 1ST 15 MIN: Performed by: ORTHOPAEDIC SURGERY

## 2024-06-01 PROCEDURE — 36415 COLL VENOUS BLD VENIPUNCTURE: CPT | Performed by: SURGERY

## 2024-06-01 PROCEDURE — 10002807 HB RX 258: Performed by: ANESTHESIOLOGY

## 2024-06-01 PROCEDURE — 10002801 HB RX 250 W/O HCPCS: Performed by: ANESTHESIOLOGY

## 2024-06-01 PROCEDURE — 80048 BASIC METABOLIC PNL TOTAL CA: CPT | Performed by: SURGERY

## 2024-06-01 PROCEDURE — 10006027 HB SUPPLY 278: Performed by: ORTHOPAEDIC SURGERY

## 2024-06-01 PROCEDURE — 13000003 HB ANESTHESIA  GENERAL EA ADD MINUTE: Performed by: ORTHOPAEDIC SURGERY

## 2024-06-01 PROCEDURE — 10002800 HB RX 250 W HCPCS: Performed by: SURGERY

## 2024-06-01 PROCEDURE — 13000117 HB ORTHO COMPLEX CASE EA ADD MINUTE: Performed by: ORTHOPAEDIC SURGERY

## 2024-06-01 PROCEDURE — 10002800 HB RX 250 W HCPCS: Performed by: ORTHOPAEDIC SURGERY

## 2024-06-01 PROCEDURE — 10002801 HB RX 250 W/O HCPCS

## 2024-06-01 PROCEDURE — 83735 ASSAY OF MAGNESIUM: CPT | Performed by: STUDENT IN AN ORGANIZED HEALTH CARE EDUCATION/TRAINING PROGRAM

## 2024-06-01 PROCEDURE — 99223 1ST HOSP IP/OBS HIGH 75: CPT | Performed by: INTERNAL MEDICINE

## 2024-06-01 PROCEDURE — 93306 TTE W/DOPPLER COMPLETE: CPT

## 2024-06-01 PROCEDURE — 10006023 HB SUPPLY 272: Performed by: ORTHOPAEDIC SURGERY

## 2024-06-01 PROCEDURE — 10004451 HB PACU RECOVERY 1ST 30 MINUTES: Performed by: ORTHOPAEDIC SURGERY

## 2024-06-01 PROCEDURE — 10005281 FL INTRAOPERATIVE C ARM WITH REPORT

## 2024-06-01 PROCEDURE — 13000116 HB ORTHO COMPLEX CASE S/U + 1ST 15 MIN: Performed by: ORTHOPAEDIC SURGERY

## 2024-06-01 PROCEDURE — 93306 TTE W/DOPPLER COMPLETE: CPT | Performed by: INTERNAL MEDICINE

## 2024-06-01 PROCEDURE — 10002803 HB RX 637: Performed by: ORTHOPAEDIC SURGERY

## 2024-06-01 PROCEDURE — 93005 ELECTROCARDIOGRAM TRACING: CPT | Performed by: SURGERY

## 2024-06-01 PROCEDURE — 10004651 HB RX, NO CHARGE ITEM: Performed by: ORTHOPAEDIC SURGERY

## 2024-06-01 PROCEDURE — 84100 ASSAY OF PHOSPHORUS: CPT | Performed by: STUDENT IN AN ORGANIZED HEALTH CARE EDUCATION/TRAINING PROGRAM

## 2024-06-01 PROCEDURE — 10002803 HB RX 637

## 2024-06-01 PROCEDURE — 10002800 HB RX 250 W HCPCS

## 2024-06-01 PROCEDURE — 85025 COMPLETE CBC W/AUTO DIFF WBC: CPT | Performed by: SURGERY

## 2024-06-01 PROCEDURE — 85025 COMPLETE CBC W/AUTO DIFF WBC: CPT | Performed by: STUDENT IN AN ORGANIZED HEALTH CARE EDUCATION/TRAINING PROGRAM

## 2024-06-01 PROCEDURE — 99232 SBSQ HOSP IP/OBS MODERATE 35: CPT

## 2024-06-01 PROCEDURE — C1713 ANCHOR/SCREW BN/BN,TIS/BN: HCPCS | Performed by: ORTHOPAEDIC SURGERY

## 2024-06-01 DEVICE — IMPLANTABLE DEVICE: Type: IMPLANTABLE DEVICE | Site: LEG UPPER | Status: FUNCTIONAL

## 2024-06-01 RX ORDER — CLONAZEPAM 0.5 MG/1
1 TABLET ORAL 2 TIMES DAILY PRN
Status: DISCONTINUED | OUTPATIENT
Start: 2024-06-01 | End: 2024-06-02

## 2024-06-01 RX ORDER — ROCURONIUM BROMIDE 10 MG/ML
INJECTION, SOLUTION INTRAVENOUS PRN
Status: DISCONTINUED | OUTPATIENT
Start: 2024-06-01 | End: 2024-06-01

## 2024-06-01 RX ORDER — TIZANIDINE 2 MG/1
2 TABLET ORAL EVERY 8 HOURS PRN
Status: DISCONTINUED | OUTPATIENT
Start: 2024-06-01 | End: 2024-06-05 | Stop reason: HOSPADM

## 2024-06-01 RX ORDER — HYDRALAZINE HYDROCHLORIDE 20 MG/ML
5 INJECTION INTRAMUSCULAR; INTRAVENOUS EVERY 10 MIN PRN
Status: DISCONTINUED | OUTPATIENT
Start: 2024-06-01 | End: 2024-06-01 | Stop reason: HOSPADM

## 2024-06-01 RX ORDER — DROPERIDOL 2.5 MG/ML
INJECTION, SOLUTION INTRAMUSCULAR; INTRAVENOUS
Status: COMPLETED
Start: 2024-06-01 | End: 2024-06-01

## 2024-06-01 RX ORDER — LEVOTHYROXINE SODIUM 0.03 MG/1
25 TABLET ORAL DAILY
Status: DISCONTINUED | OUTPATIENT
Start: 2024-06-02 | End: 2024-06-02

## 2024-06-01 RX ORDER — NICOTINE POLACRILEX 4 MG
30 LOZENGE BUCCAL
Status: DISCONTINUED | OUTPATIENT
Start: 2024-06-01 | End: 2024-06-01 | Stop reason: HOSPADM

## 2024-06-01 RX ORDER — LOSARTAN POTASSIUM 50 MG/1
100 TABLET ORAL NIGHTLY
Status: DISCONTINUED | OUTPATIENT
Start: 2024-06-01 | End: 2024-06-02

## 2024-06-01 RX ORDER — FUROSEMIDE 20 MG/1
20 TABLET ORAL DAILY
Status: DISCONTINUED | OUTPATIENT
Start: 2024-06-02 | End: 2024-06-02

## 2024-06-01 RX ORDER — ACETAMINOPHEN 10 MG/ML
1000 INJECTION, SOLUTION INTRAVENOUS ONCE
Status: COMPLETED | OUTPATIENT
Start: 2024-06-01 | End: 2024-06-04

## 2024-06-01 RX ORDER — ROSUVASTATIN CALCIUM 10 MG/1
10 TABLET, COATED ORAL DAILY
Status: DISCONTINUED | OUTPATIENT
Start: 2024-06-01 | End: 2024-06-02

## 2024-06-01 RX ORDER — PROPOFOL 10 MG/ML
INJECTION, EMULSION INTRAVENOUS PRN
Status: DISCONTINUED | OUTPATIENT
Start: 2024-06-01 | End: 2024-06-01

## 2024-06-01 RX ORDER — DEXTROSE MONOHYDRATE 25 G/50ML
25 INJECTION, SOLUTION INTRAVENOUS PRN
Status: DISCONTINUED | OUTPATIENT
Start: 2024-06-01 | End: 2024-06-01 | Stop reason: HOSPADM

## 2024-06-01 RX ORDER — DROPERIDOL 2.5 MG/ML
0.62 INJECTION, SOLUTION INTRAMUSCULAR; INTRAVENOUS ONCE
Status: COMPLETED | OUTPATIENT
Start: 2024-06-01 | End: 2024-06-01

## 2024-06-01 RX ORDER — ENOXAPARIN SODIUM 100 MG/ML
30 INJECTION SUBCUTANEOUS EVERY 24 HOURS
Status: DISCONTINUED | OUTPATIENT
Start: 2024-06-02 | End: 2024-06-02

## 2024-06-01 RX ORDER — SODIUM CHLORIDE, SODIUM LACTATE, POTASSIUM CHLORIDE, CALCIUM CHLORIDE 600; 310; 30; 20 MG/100ML; MG/100ML; MG/100ML; MG/100ML
INJECTION, SOLUTION INTRAVENOUS CONTINUOUS PRN
Status: DISCONTINUED | OUTPATIENT
Start: 2024-06-01 | End: 2024-06-01

## 2024-06-01 RX ADMIN — ROCURONIUM BROMIDE 20 MG: 10 INJECTION INTRAVENOUS at 15:08

## 2024-06-01 RX ADMIN — SODIUM CHLORIDE, PRESERVATIVE FREE 2 ML: 5 INJECTION INTRAVENOUS at 23:16

## 2024-06-01 RX ADMIN — SUGAMMADEX 200 MG: 100 INJECTION, SOLUTION INTRAVENOUS at 16:26

## 2024-06-01 RX ADMIN — ROCURONIUM BROMIDE 20 MG: 10 INJECTION INTRAVENOUS at 14:18

## 2024-06-01 RX ADMIN — ROCURONIUM BROMIDE 20 MG: 10 INJECTION INTRAVENOUS at 15:51

## 2024-06-01 RX ADMIN — GABAPENTIN 100 MG: 100 CAPSULE ORAL at 22:11

## 2024-06-01 RX ADMIN — FENTANYL CITRATE 100 MCG: 50 INJECTION INTRAMUSCULAR; INTRAVENOUS at 13:45

## 2024-06-01 RX ADMIN — CYCLOBENZAPRINE HYDROCHLORIDE 5 MG: 5 TABLET, FILM COATED ORAL at 06:37

## 2024-06-01 RX ADMIN — FENTANYL CITRATE 100 MCG: 50 INJECTION INTRAMUSCULAR; INTRAVENOUS at 16:29

## 2024-06-01 RX ADMIN — SODIUM CHLORIDE, POTASSIUM CHLORIDE, SODIUM LACTATE AND CALCIUM CHLORIDE: 600; 310; 30; 20 INJECTION, SOLUTION INTRAVENOUS at 13:37

## 2024-06-01 RX ADMIN — CEFAZOLIN SODIUM 2000 MG: 300 INJECTION, POWDER, LYOPHILIZED, FOR SOLUTION INTRAVENOUS at 14:27

## 2024-06-01 RX ADMIN — PROPOFOL 100 MG: 10 INJECTION, EMULSION INTRAVENOUS at 13:45

## 2024-06-01 RX ADMIN — TIZANIDINE 2 MG: 2 TABLET ORAL at 23:15

## 2024-06-01 RX ADMIN — ROCURONIUM BROMIDE 50 MG: 10 INJECTION INTRAVENOUS at 13:45

## 2024-06-01 RX ADMIN — CEFAZOLIN SODIUM 2000 MG: 300 INJECTION, POWDER, LYOPHILIZED, FOR SOLUTION INTRAVENOUS at 22:12

## 2024-06-01 RX ADMIN — ROSUVASTATIN CALCIUM 10 MG: 10 TABLET, FILM COATED ORAL at 22:12

## 2024-06-01 RX ADMIN — MORPHINE SULFATE 2 MG: 2 INJECTION, SOLUTION INTRAMUSCULAR; INTRAVENOUS at 06:20

## 2024-06-01 RX ADMIN — OXYCODONE 5 MG: 5 TABLET ORAL at 00:39

## 2024-06-01 RX ADMIN — FENTANYL CITRATE 12.5 MCG: 50 INJECTION INTRAMUSCULAR; INTRAVENOUS at 17:29

## 2024-06-01 RX ADMIN — ACETAMINOPHEN 1000 MG: 10 INJECTION, SOLUTION INTRAVENOUS at 18:43

## 2024-06-01 RX ADMIN — MORPHINE SULFATE 2 MG: 2 INJECTION, SOLUTION INTRAMUSCULAR; INTRAVENOUS at 10:15

## 2024-06-01 RX ADMIN — DROPERIDOL 0.62 MG: 2.5 INJECTION, SOLUTION INTRAMUSCULAR; INTRAVENOUS at 18:12

## 2024-06-01 RX ADMIN — SODIUM CHLORIDE, POTASSIUM CHLORIDE, SODIUM LACTATE AND CALCIUM CHLORIDE: 600; 310; 30; 20 INJECTION, SOLUTION INTRAVENOUS at 06:30

## 2024-06-01 ASSESSMENT — PAIN SCALES - GENERAL
PAINLEVEL_OUTOF10: 7
PAINLEVEL_OUTOF10: 0
PAINLEVEL_OUTOF10: 6
PAINLEVEL_OUTOF10: 9
PAINLEVEL_OUTOF10: 9

## 2024-06-01 ASSESSMENT — COPD QUESTIONNAIRES: COPD: 1

## 2024-06-02 VITALS
WEIGHT: 193.34 LBS | OXYGEN SATURATION: 96 % | HEIGHT: 72 IN | TEMPERATURE: 97.5 F | HEART RATE: 67 BPM | SYSTOLIC BLOOD PRESSURE: 125 MMHG | DIASTOLIC BLOOD PRESSURE: 71 MMHG | RESPIRATION RATE: 16 BRPM | BODY MASS INDEX: 26.19 KG/M2

## 2024-06-02 LAB
ANION GAP SERPL CALC-SCNC: 7 MMOL/L (ref 7–19)
ANION GAP SERPL CALC-SCNC: 7 MMOL/L (ref 7–19)
BUN SERPL-MCNC: 22 MG/DL (ref 6–20)
BUN SERPL-MCNC: 22 MG/DL (ref 6–20)
BUN/CREAT SERPL: 19 (ref 7–25)
BUN/CREAT SERPL: 19 (ref 7–25)
CALCIUM SERPL-MCNC: 8.6 MG/DL (ref 8.4–10.2)
CALCIUM SERPL-MCNC: 8.6 MG/DL (ref 8.4–10.2)
CHLORIDE SERPL-SCNC: 104 MMOL/L (ref 97–110)
CHLORIDE SERPL-SCNC: 104 MMOL/L (ref 97–110)
CO2 SERPL-SCNC: 29 MMOL/L (ref 21–32)
CO2 SERPL-SCNC: 29 MMOL/L (ref 21–32)
CREAT SERPL-MCNC: 1.17 MG/DL (ref 0.67–1.17)
CREAT SERPL-MCNC: 1.17 MG/DL (ref 0.67–1.17)
DEPRECATED RDW RBC: 43 FL (ref 39–50)
DEPRECATED RDW RBC: 43 FL (ref 39–50)
EGFRCR SERPLBLD CKD-EPI 2021: 62 ML/MIN/{1.73_M2}
EGFRCR SERPLBLD CKD-EPI 2021: 62 ML/MIN/{1.73_M2}
ERYTHROCYTE [DISTWIDTH] IN BLOOD: 12.8 % (ref 11–15)
ERYTHROCYTE [DISTWIDTH] IN BLOOD: 12.8 % (ref 11–15)
FASTING DURATION TIME PATIENT: ABNORMAL H
FASTING DURATION TIME PATIENT: ABNORMAL H
GLUCOSE SERPL-MCNC: 137 MG/DL (ref 70–99)
GLUCOSE SERPL-MCNC: 137 MG/DL (ref 70–99)
HCT VFR BLD CALC: 30.6 % (ref 39–51)
HCT VFR BLD CALC: 30.6 % (ref 39–51)
HGB BLD-MCNC: 9.8 G/DL (ref 13–17)
HGB BLD-MCNC: 9.8 G/DL (ref 13–17)
MAGNESIUM SERPL-MCNC: 2 MG/DL (ref 1.7–2.4)
MAGNESIUM SERPL-MCNC: 2 MG/DL (ref 1.7–2.4)
MCH RBC QN AUTO: 29.3 PG (ref 26–34)
MCH RBC QN AUTO: 29.3 PG (ref 26–34)
MCHC RBC AUTO-ENTMCNC: 32 G/DL (ref 32–36.5)
MCHC RBC AUTO-ENTMCNC: 32 G/DL (ref 32–36.5)
MCV RBC AUTO: 91.6 FL (ref 78–100)
MCV RBC AUTO: 91.6 FL (ref 78–100)
NRBC BLD MANUAL-RTO: 0 /100 WBC
NRBC BLD MANUAL-RTO: 0 /100 WBC
PHOSPHATE SERPL-MCNC: 1.9 MG/DL (ref 2.4–4.7)
PHOSPHATE SERPL-MCNC: 1.9 MG/DL (ref 2.4–4.7)
PLATELET # BLD AUTO: 176 K/MCL (ref 140–450)
PLATELET # BLD AUTO: 176 K/MCL (ref 140–450)
POTASSIUM SERPL-SCNC: 4 MMOL/L (ref 3.4–5.1)
POTASSIUM SERPL-SCNC: 4 MMOL/L (ref 3.4–5.1)
RBC # BLD: 3.34 MIL/MCL (ref 4.5–5.9)
RBC # BLD: 3.34 MIL/MCL (ref 4.5–5.9)
SODIUM SERPL-SCNC: 136 MMOL/L (ref 135–145)
SODIUM SERPL-SCNC: 136 MMOL/L (ref 135–145)
TROPONIN I SERPL DL<=0.01 NG/ML-MCNC: 134 NG/L
TROPONIN I SERPL DL<=0.01 NG/ML-MCNC: 134 NG/L
WBC # BLD: 14.9 K/MCL (ref 4.2–11)
WBC # BLD: 14.9 K/MCL (ref 4.2–11)

## 2024-06-02 PROCEDURE — 10002803 HB RX 637: Performed by: SURGERY

## 2024-06-02 PROCEDURE — 10002803 HB RX 637

## 2024-06-02 PROCEDURE — 36415 COLL VENOUS BLD VENIPUNCTURE: CPT

## 2024-06-02 PROCEDURE — 10002019 HB COUNTER RESP ASSESSMENT

## 2024-06-02 PROCEDURE — 80048 BASIC METABOLIC PNL TOTAL CA: CPT

## 2024-06-02 PROCEDURE — 97166 OT EVAL MOD COMPLEX 45 MIN: CPT

## 2024-06-02 PROCEDURE — 10004180 HB COUNTER-TRANSPORT

## 2024-06-02 PROCEDURE — 96372 THER/PROPH/DIAG INJ SC/IM: CPT

## 2024-06-02 PROCEDURE — 10002800 HB RX 250 W HCPCS

## 2024-06-02 PROCEDURE — 84100 ASSAY OF PHOSPHORUS: CPT

## 2024-06-02 PROCEDURE — 97530 THERAPEUTIC ACTIVITIES: CPT

## 2024-06-02 PROCEDURE — 10002803 HB RX 637: Performed by: INTERNAL MEDICINE

## 2024-06-02 PROCEDURE — 10006031 HB ROOM CHARGE TELEMETRY

## 2024-06-02 PROCEDURE — 97162 PT EVAL MOD COMPLEX 30 MIN: CPT

## 2024-06-02 PROCEDURE — 10002800 HB RX 250 W HCPCS: Performed by: ORTHOPAEDIC SURGERY

## 2024-06-02 PROCEDURE — 97535 SELF CARE MNGMENT TRAINING: CPT

## 2024-06-02 PROCEDURE — 10004651 HB RX, NO CHARGE ITEM: Performed by: ORTHOPAEDIC SURGERY

## 2024-06-02 PROCEDURE — 85027 COMPLETE CBC AUTOMATED: CPT

## 2024-06-02 PROCEDURE — 99233 SBSQ HOSP IP/OBS HIGH 50: CPT | Performed by: INTERNAL MEDICINE

## 2024-06-02 PROCEDURE — 84484 ASSAY OF TROPONIN QUANT: CPT | Performed by: STUDENT IN AN ORGANIZED HEALTH CARE EDUCATION/TRAINING PROGRAM

## 2024-06-02 PROCEDURE — 83735 ASSAY OF MAGNESIUM: CPT

## 2024-06-02 PROCEDURE — 10002803 HB RX 637: Performed by: ORTHOPAEDIC SURGERY

## 2024-06-02 RX ORDER — EPLERENONE 25 MG/1
25 TABLET, FILM COATED ORAL DAILY
COMMUNITY

## 2024-06-02 RX ORDER — TORSEMIDE 20 MG/1
20 TABLET ORAL DAILY
Status: DISCONTINUED | OUTPATIENT
Start: 2024-06-02 | End: 2024-06-05 | Stop reason: HOSPADM

## 2024-06-02 RX ORDER — TRAMADOL HYDROCHLORIDE 50 MG/1
50 TABLET ORAL EVERY 6 HOURS PRN
Status: DISCONTINUED | OUTPATIENT
Start: 2024-06-02 | End: 2024-06-05 | Stop reason: HOSPADM

## 2024-06-02 RX ORDER — POTASSIUM CHLORIDE 20 MEQ/1
40 TABLET, EXTENDED RELEASE ORAL ONCE
Status: COMPLETED | OUTPATIENT
Start: 2024-06-02 | End: 2024-06-02

## 2024-06-02 RX ORDER — AMOXICILLIN 250 MG
2 CAPSULE ORAL NIGHTLY
Status: DISCONTINUED | OUTPATIENT
Start: 2024-06-02 | End: 2024-06-05 | Stop reason: HOSPADM

## 2024-06-02 RX ORDER — ESCITALOPRAM OXALATE 10 MG/1
10 TABLET ORAL DAILY
Status: DISCONTINUED | OUTPATIENT
Start: 2024-06-02 | End: 2024-06-05 | Stop reason: HOSPADM

## 2024-06-02 RX ORDER — EPLERENONE 25 MG/1
25 TABLET, FILM COATED ORAL DAILY
Status: DISCONTINUED | OUTPATIENT
Start: 2024-06-02 | End: 2024-06-05 | Stop reason: HOSPADM

## 2024-06-02 RX ORDER — HYDRALAZINE HYDROCHLORIDE 20 MG/ML
10 INJECTION INTRAMUSCULAR; INTRAVENOUS EVERY 6 HOURS PRN
Status: DISCONTINUED | OUTPATIENT
Start: 2024-06-02 | End: 2024-06-05 | Stop reason: HOSPADM

## 2024-06-02 RX ORDER — BUDESONIDE AND FORMOTEROL FUMARATE DIHYDRATE 160; 4.5 UG/1; UG/1
2 AEROSOL RESPIRATORY (INHALATION) 2 TIMES DAILY PRN
Status: DISCONTINUED | OUTPATIENT
Start: 2024-06-02 | End: 2024-06-05 | Stop reason: HOSPADM

## 2024-06-02 RX ORDER — BISOPROLOL FUMARATE 5 MG/1
2.5 TABLET, FILM COATED ORAL DAILY
Status: ON HOLD | COMMUNITY
End: 2024-06-05 | Stop reason: HOSPADM

## 2024-06-02 RX ORDER — ESCITALOPRAM OXALATE 10 MG/1
10 TABLET ORAL DAILY
COMMUNITY

## 2024-06-02 RX ORDER — ATORVASTATIN CALCIUM 40 MG/1
40 TABLET, FILM COATED ORAL DAILY
COMMUNITY

## 2024-06-02 RX ORDER — ENOXAPARIN SODIUM 100 MG/ML
30 INJECTION SUBCUTANEOUS EVERY 12 HOURS
Status: DISCONTINUED | OUTPATIENT
Start: 2024-06-02 | End: 2024-06-05 | Stop reason: HOSPADM

## 2024-06-02 RX ORDER — TORSEMIDE 20 MG/1
20 TABLET ORAL DAILY
COMMUNITY

## 2024-06-02 RX ORDER — BUDESONIDE AND FORMOTEROL FUMARATE DIHYDRATE 160; 4.5 UG/1; UG/1
2 AEROSOL RESPIRATORY (INHALATION) 2 TIMES DAILY PRN
COMMUNITY

## 2024-06-02 RX ORDER — POLYETHYLENE GLYCOL 3350 17 G/17G
17 POWDER, FOR SOLUTION ORAL DAILY
Status: DISCONTINUED | OUTPATIENT
Start: 2024-06-02 | End: 2024-06-05 | Stop reason: HOSPADM

## 2024-06-02 RX ORDER — BISACODYL 10 MG
10 SUPPOSITORY, RECTAL RECTAL DAILY PRN
Status: DISCONTINUED | OUTPATIENT
Start: 2024-06-02 | End: 2024-06-05 | Stop reason: HOSPADM

## 2024-06-02 RX ORDER — ATORVASTATIN CALCIUM 40 MG/1
40 TABLET, FILM COATED ORAL NIGHTLY
Status: DISCONTINUED | OUTPATIENT
Start: 2024-06-02 | End: 2024-06-05 | Stop reason: HOSPADM

## 2024-06-02 RX ADMIN — OXYCODONE 5 MG: 5 TABLET ORAL at 09:39

## 2024-06-02 RX ADMIN — Medication 250 MG: at 14:40

## 2024-06-02 RX ADMIN — ACETAMINOPHEN 1000 MG: 500 TABLET ORAL at 14:41

## 2024-06-02 RX ADMIN — CEFAZOLIN SODIUM 2000 MG: 300 INJECTION, POWDER, LYOPHILIZED, FOR SOLUTION INTRAVENOUS at 05:34

## 2024-06-02 RX ADMIN — ACETAMINOPHEN 1000 MG: 500 TABLET ORAL at 05:33

## 2024-06-02 RX ADMIN — ESCITALOPRAM OXALATE 10 MG: 10 TABLET ORAL at 18:48

## 2024-06-02 RX ADMIN — SODIUM CHLORIDE, PRESERVATIVE FREE 2 ML: 5 INJECTION INTRAVENOUS at 21:01

## 2024-06-02 RX ADMIN — DIBASIC SODIUM PHOSPHATE, MONOBASIC POTASSIUM PHOSPHATE AND MONOBASIC SODIUM PHOSPHATE 250 MG: 852; 155; 130 TABLET ORAL at 21:23

## 2024-06-02 RX ADMIN — POTASSIUM CHLORIDE 40 MEQ: 1500 TABLET, EXTENDED RELEASE ORAL at 14:47

## 2024-06-02 RX ADMIN — ATORVASTATIN CALCIUM 40 MG: 40 TABLET, FILM COATED ORAL at 20:42

## 2024-06-02 RX ADMIN — TIZANIDINE 2 MG: 2 TABLET ORAL at 20:42

## 2024-06-02 RX ADMIN — MAGNESIUM HYDROXIDE 30 ML: 400 SUSPENSION ORAL at 20:42

## 2024-06-02 RX ADMIN — METOPROLOL TARTRATE 25 MG: 25 TABLET, FILM COATED ORAL at 09:39

## 2024-06-02 RX ADMIN — ENOXAPARIN SODIUM 30 MG: 100 INJECTION SUBCUTANEOUS at 20:42

## 2024-06-02 RX ADMIN — CEFAZOLIN SODIUM 2000 MG: 300 INJECTION, POWDER, LYOPHILIZED, FOR SOLUTION INTRAVENOUS at 14:37

## 2024-06-02 RX ADMIN — SENNOSIDES AND DOCUSATE SODIUM 2 TABLET: 8.6; 5 TABLET ORAL at 20:43

## 2024-06-02 RX ADMIN — METOPROLOL TARTRATE 25 MG: 25 TABLET, FILM COATED ORAL at 20:43

## 2024-06-02 RX ADMIN — GABAPENTIN 100 MG: 100 CAPSULE ORAL at 14:39

## 2024-06-02 RX ADMIN — TORSEMIDE 20 MG: 20 TABLET ORAL at 18:48

## 2024-06-02 RX ADMIN — Medication 250 MG: at 18:48

## 2024-06-02 RX ADMIN — ACETAMINOPHEN 1000 MG: 500 TABLET ORAL at 20:42

## 2024-06-02 RX ADMIN — POLYETHYLENE GLYCOL (3350) 17 G: 17 POWDER, FOR SOLUTION ORAL at 14:47

## 2024-06-02 RX ADMIN — GABAPENTIN 100 MG: 100 CAPSULE ORAL at 09:39

## 2024-06-02 RX ADMIN — TRAMADOL HYDROCHLORIDE 50 MG: 50 TABLET, COATED ORAL at 12:37

## 2024-06-02 ASSESSMENT — COGNITIVE AND FUNCTIONAL STATUS - GENERAL
HELP NEEDED FOR TOILETING: A LOT
HELP NEEDED DRESSING REGULAR LOWER BODY CLOTHING: A LOT
DAILY_ACTIVITY_CONVERTED_SCORE: 40.22
DAILY_ACTIVITY_RAW_SCORE: 19
BASIC_MOBILITY_CONVERTED_SCORE: 35.55
BASIC_MOBILITY_RAW_SCORE: 14
HELP NEEDED FOR BATHING: A LITTLE

## 2024-06-02 ASSESSMENT — ENCOUNTER SYMPTOMS
PAIN SEVERITY NOW: 2
PAIN SEVERITY NOW: 3

## 2024-06-02 ASSESSMENT — PAIN SCALES - GENERAL
PAINLEVEL_OUTOF10: 2
PAINLEVEL_OUTOF10: 1
PAINLEVEL_OUTOF10: 2
PAINLEVEL_OUTOF10: 2
PAINLEVEL_OUTOF10: 3
PAINLEVEL_OUTOF10: 2
PAINLEVEL_OUTOF10: 6
PAINLEVEL_OUTOF10: 2
PAINLEVEL_OUTOF10: 5
PAINLEVEL_OUTOF10: 2

## 2024-06-02 ASSESSMENT — PULMONARY FUNCTION TESTS: FEV1/FVC: UNABLE TO OBTAIN, OR GREATER THAN 70%

## 2024-06-02 ASSESSMENT — ACTIVITIES OF DAILY LIVING (ADL)
PRIOR_ADL: INDEPENDENT
HOME_MANAGEMENT_TIME_ENTRY: 18

## 2024-06-03 LAB
ANION GAP SERPL CALC-SCNC: 9 MMOL/L (ref 7–19)
ANION GAP SERPL CALC-SCNC: 9 MMOL/L (ref 7–19)
BUN SERPL-MCNC: 23 MG/DL (ref 6–20)
BUN SERPL-MCNC: 23 MG/DL (ref 6–20)
BUN/CREAT SERPL: 23 (ref 7–25)
BUN/CREAT SERPL: 23 (ref 7–25)
CALCIUM SERPL-MCNC: 8.4 MG/DL (ref 8.4–10.2)
CALCIUM SERPL-MCNC: 8.4 MG/DL (ref 8.4–10.2)
CHLORIDE SERPL-SCNC: 99 MMOL/L (ref 97–110)
CHLORIDE SERPL-SCNC: 99 MMOL/L (ref 97–110)
CO2 SERPL-SCNC: 31 MMOL/L (ref 21–32)
CO2 SERPL-SCNC: 31 MMOL/L (ref 21–32)
CREAT SERPL-MCNC: 1.01 MG/DL (ref 0.67–1.17)
CREAT SERPL-MCNC: 1.01 MG/DL (ref 0.67–1.17)
DEPRECATED RDW RBC: 40.6 FL (ref 39–50)
DEPRECATED RDW RBC: 40.6 FL (ref 39–50)
EGFRCR SERPLBLD CKD-EPI 2021: 74 ML/MIN/{1.73_M2}
EGFRCR SERPLBLD CKD-EPI 2021: 74 ML/MIN/{1.73_M2}
ERYTHROCYTE [DISTWIDTH] IN BLOOD: 12.4 % (ref 11–15)
ERYTHROCYTE [DISTWIDTH] IN BLOOD: 12.4 % (ref 11–15)
FASTING DURATION TIME PATIENT: ABNORMAL H
FASTING DURATION TIME PATIENT: ABNORMAL H
GLUCOSE SERPL-MCNC: 121 MG/DL (ref 70–99)
GLUCOSE SERPL-MCNC: 121 MG/DL (ref 70–99)
HCT VFR BLD CALC: 24.9 % (ref 39–51)
HCT VFR BLD CALC: 24.9 % (ref 39–51)
HGB BLD-MCNC: 8.2 G/DL (ref 13–17)
HGB BLD-MCNC: 8.2 G/DL (ref 13–17)
MAGNESIUM SERPL-MCNC: 2.1 MG/DL (ref 1.7–2.4)
MAGNESIUM SERPL-MCNC: 2.1 MG/DL (ref 1.7–2.4)
MCH RBC QN AUTO: 29.4 PG (ref 26–34)
MCH RBC QN AUTO: 29.4 PG (ref 26–34)
MCHC RBC AUTO-ENTMCNC: 32.9 G/DL (ref 32–36.5)
MCHC RBC AUTO-ENTMCNC: 32.9 G/DL (ref 32–36.5)
MCV RBC AUTO: 89.2 FL (ref 78–100)
MCV RBC AUTO: 89.2 FL (ref 78–100)
NRBC BLD MANUAL-RTO: 0 /100 WBC
NRBC BLD MANUAL-RTO: 0 /100 WBC
PHOSPHATE SERPL-MCNC: 1.7 MG/DL (ref 2.4–4.7)
PHOSPHATE SERPL-MCNC: 1.7 MG/DL (ref 2.4–4.7)
PLATELET # BLD AUTO: 155 K/MCL (ref 140–450)
PLATELET # BLD AUTO: 155 K/MCL (ref 140–450)
POTASSIUM SERPL-SCNC: 3.7 MMOL/L (ref 3.4–5.1)
POTASSIUM SERPL-SCNC: 3.7 MMOL/L (ref 3.4–5.1)
RBC # BLD: 2.79 MIL/MCL (ref 4.5–5.9)
RBC # BLD: 2.79 MIL/MCL (ref 4.5–5.9)
SODIUM SERPL-SCNC: 135 MMOL/L (ref 135–145)
SODIUM SERPL-SCNC: 135 MMOL/L (ref 135–145)
TROPONIN I SERPL DL<=0.01 NG/ML-MCNC: 74 NG/L
TROPONIN I SERPL DL<=0.01 NG/ML-MCNC: 74 NG/L
WBC # BLD: 14 K/MCL (ref 4.2–11)
WBC # BLD: 14 K/MCL (ref 4.2–11)

## 2024-06-03 PROCEDURE — 99232 SBSQ HOSP IP/OBS MODERATE 35: CPT | Performed by: INTERNAL MEDICINE

## 2024-06-03 PROCEDURE — 96372 THER/PROPH/DIAG INJ SC/IM: CPT

## 2024-06-03 PROCEDURE — 83735 ASSAY OF MAGNESIUM: CPT

## 2024-06-03 PROCEDURE — 97530 THERAPEUTIC ACTIVITIES: CPT

## 2024-06-03 PROCEDURE — 84100 ASSAY OF PHOSPHORUS: CPT

## 2024-06-03 PROCEDURE — 10002801 HB RX 250 W/O HCPCS: Performed by: SURGERY

## 2024-06-03 PROCEDURE — 84484 ASSAY OF TROPONIN QUANT: CPT

## 2024-06-03 PROCEDURE — 85027 COMPLETE CBC AUTOMATED: CPT

## 2024-06-03 PROCEDURE — 10006031 HB ROOM CHARGE TELEMETRY

## 2024-06-03 PROCEDURE — 99233 SBSQ HOSP IP/OBS HIGH 50: CPT | Performed by: NURSE PRACTITIONER

## 2024-06-03 PROCEDURE — 10002803 HB RX 637: Performed by: SURGERY

## 2024-06-03 PROCEDURE — 10002803 HB RX 637: Performed by: INTERNAL MEDICINE

## 2024-06-03 PROCEDURE — 10004651 HB RX, NO CHARGE ITEM: Performed by: ORTHOPAEDIC SURGERY

## 2024-06-03 PROCEDURE — 10002807 HB RX 258: Performed by: SURGERY

## 2024-06-03 PROCEDURE — 10002800 HB RX 250 W HCPCS

## 2024-06-03 PROCEDURE — 10002803 HB RX 637

## 2024-06-03 PROCEDURE — 80048 BASIC METABOLIC PNL TOTAL CA: CPT

## 2024-06-03 PROCEDURE — 36415 COLL VENOUS BLD VENIPUNCTURE: CPT

## 2024-06-03 RX ORDER — POTASSIUM CHLORIDE 20 MEQ/1
40 TABLET, EXTENDED RELEASE ORAL ONCE
Status: DISCONTINUED | OUTPATIENT
Start: 2024-06-03 | End: 2024-06-03

## 2024-06-03 RX ADMIN — TIZANIDINE 2 MG: 2 TABLET ORAL at 20:42

## 2024-06-03 RX ADMIN — DIBASIC SODIUM PHOSPHATE, MONOBASIC POTASSIUM PHOSPHATE AND MONOBASIC SODIUM PHOSPHATE 250 MG: 852; 155; 130 TABLET ORAL at 00:15

## 2024-06-03 RX ADMIN — METOPROLOL TARTRATE 25 MG: 25 TABLET, FILM COATED ORAL at 08:33

## 2024-06-03 RX ADMIN — TRAMADOL HYDROCHLORIDE 50 MG: 50 TABLET, COATED ORAL at 04:39

## 2024-06-03 RX ADMIN — POLYETHYLENE GLYCOL (3350) 17 G: 17 POWDER, FOR SOLUTION ORAL at 08:33

## 2024-06-03 RX ADMIN — METOPROLOL TARTRATE 25 MG: 25 TABLET, FILM COATED ORAL at 20:42

## 2024-06-03 RX ADMIN — TIZANIDINE 2 MG: 2 TABLET ORAL at 08:33

## 2024-06-03 RX ADMIN — TORSEMIDE 20 MG: 20 TABLET ORAL at 08:33

## 2024-06-03 RX ADMIN — EPLERENONE 25 MG: 25 TABLET, FILM COATED ORAL at 08:33

## 2024-06-03 RX ADMIN — TRAMADOL HYDROCHLORIDE 50 MG: 50 TABLET, COATED ORAL at 23:04

## 2024-06-03 RX ADMIN — ACETAMINOPHEN 1000 MG: 500 TABLET ORAL at 20:42

## 2024-06-03 RX ADMIN — ATORVASTATIN CALCIUM 40 MG: 40 TABLET, FILM COATED ORAL at 20:42

## 2024-06-03 RX ADMIN — POTASSIUM PHOSPHATE, MONOBASIC AND POTASSIUM PHOSPHATE, DIBASIC 30 MMOL: 224; 236 INJECTION, SOLUTION, CONCENTRATE INTRAVENOUS at 11:04

## 2024-06-03 RX ADMIN — SENNOSIDES AND DOCUSATE SODIUM 2 TABLET: 8.6; 5 TABLET ORAL at 20:42

## 2024-06-03 RX ADMIN — ACETAMINOPHEN 1000 MG: 500 TABLET ORAL at 14:43

## 2024-06-03 RX ADMIN — ESCITALOPRAM OXALATE 10 MG: 10 TABLET ORAL at 08:33

## 2024-06-03 RX ADMIN — ENOXAPARIN SODIUM 30 MG: 100 INJECTION SUBCUTANEOUS at 08:34

## 2024-06-03 RX ADMIN — ACETAMINOPHEN 1000 MG: 500 TABLET ORAL at 04:39

## 2024-06-03 RX ADMIN — ENOXAPARIN SODIUM 30 MG: 100 INJECTION SUBCUTANEOUS at 20:42

## 2024-06-03 ASSESSMENT — COGNITIVE AND FUNCTIONAL STATUS - GENERAL
DAILY_ACTIVITY_CONVERTED_SCORE: 34.69
DAILY_ACTIVITY_RAW_SCORE: 15
HELP NEEDED FOR PERSONAL GROOMING: A LITTLE
HELP NEEDED FOR TOILETING: A LOT
HELP NEEDED DRESSING REGULAR LOWER BODY CLOTHING: TOTAL
HELP NEEDED FOR BATHING: A LOT
HELP NEEDED DRESSING REGULAR UPPER BODY CLOTHING: A LITTLE

## 2024-06-03 ASSESSMENT — PAIN SCALES - GENERAL
PAINLEVEL_OUTOF10: 3
PAINLEVEL_OUTOF10: 3
PAINLEVEL_OUTOF10: 0
PAINLEVEL_OUTOF10: 4
PAINLEVEL_OUTOF10: 2
PAINLEVEL_OUTOF10: 5

## 2024-06-03 ASSESSMENT — ENCOUNTER SYMPTOMS
PAIN SEVERITY NOW: 2
FEVER: 0

## 2024-06-04 ENCOUNTER — APPOINTMENT (OUTPATIENT)
Dept: GENERAL RADIOLOGY | Age: 84
DRG: 956 | End: 2024-06-04
Attending: NURSE PRACTITIONER

## 2024-06-04 ENCOUNTER — APPOINTMENT (OUTPATIENT)
Dept: CT IMAGING | Age: 84
DRG: 956 | End: 2024-06-04
Attending: NURSE PRACTITIONER

## 2024-06-04 PROBLEM — I10 HYPERTENSION: Status: ACTIVE | Noted: 2024-06-04
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
## 2024-06-04 PROBLEM — F43.20 ADJUSTMENT DISORDER: Status: ACTIVE | Noted: 2024-06-04

## 2024-06-04 PROBLEM — D62 ACUTE BLOOD LOSS ANEMIA: Status: ACTIVE | Noted: 2024-06-04

## 2024-06-04 LAB
PHOSPHATE SERPL-MCNC: 2.4 MG/DL (ref 2.4–4.7)
PHOSPHATE SERPL-MCNC: 2.4 MG/DL (ref 2.4–4.7)
POTASSIUM SERPL-SCNC: 3.5 MMOL/L (ref 3.4–5.1)
POTASSIUM SERPL-SCNC: 3.5 MMOL/L (ref 3.4–5.1)
POTASSIUM SERPL-SCNC: 4 MMOL/L (ref 3.4–5.1)
POTASSIUM SERPL-SCNC: 4 MMOL/L (ref 3.4–5.1)

## 2024-06-04 PROCEDURE — 10006031 HB ROOM CHARGE TELEMETRY

## 2024-06-04 PROCEDURE — 10004651 HB RX, NO CHARGE ITEM: Performed by: ORTHOPAEDIC SURGERY

## 2024-06-04 PROCEDURE — 73080 X-RAY EXAM OF ELBOW: CPT

## 2024-06-04 PROCEDURE — 84100 ASSAY OF PHOSPHORUS: CPT | Performed by: SURGERY

## 2024-06-04 PROCEDURE — 84132 ASSAY OF SERUM POTASSIUM: CPT | Performed by: SURGERY

## 2024-06-04 PROCEDURE — 99223 1ST HOSP IP/OBS HIGH 75: CPT | Performed by: PHYSICAL MEDICINE & REHABILITATION

## 2024-06-04 PROCEDURE — 10002800 HB RX 250 W HCPCS

## 2024-06-04 PROCEDURE — 10002803 HB RX 637: Performed by: SURGERY

## 2024-06-04 PROCEDURE — 73200 CT UPPER EXTREMITY W/O DYE: CPT

## 2024-06-04 PROCEDURE — 36415 COLL VENOUS BLD VENIPUNCTURE: CPT | Performed by: SURGERY

## 2024-06-04 PROCEDURE — 97535 SELF CARE MNGMENT TRAINING: CPT

## 2024-06-04 PROCEDURE — 99233 SBSQ HOSP IP/OBS HIGH 50: CPT | Performed by: SURGERY

## 2024-06-04 PROCEDURE — 97530 THERAPEUTIC ACTIVITIES: CPT

## 2024-06-04 PROCEDURE — 96372 THER/PROPH/DIAG INJ SC/IM: CPT

## 2024-06-04 PROCEDURE — 10002803 HB RX 637: Performed by: INTERNAL MEDICINE

## 2024-06-04 PROCEDURE — 10002803 HB RX 637

## 2024-06-04 RX ORDER — POTASSIUM CHLORIDE 20 MEQ/1
40 TABLET, EXTENDED RELEASE ORAL ONCE
Status: COMPLETED | OUTPATIENT
Start: 2024-06-04 | End: 2024-06-04

## 2024-06-04 RX ADMIN — Medication 250 MG: at 07:55

## 2024-06-04 RX ADMIN — ATORVASTATIN CALCIUM 40 MG: 40 TABLET, FILM COATED ORAL at 21:26

## 2024-06-04 RX ADMIN — ESCITALOPRAM OXALATE 10 MG: 10 TABLET ORAL at 07:56

## 2024-06-04 RX ADMIN — ENOXAPARIN SODIUM 30 MG: 100 INJECTION SUBCUTANEOUS at 21:28

## 2024-06-04 RX ADMIN — SENNOSIDES AND DOCUSATE SODIUM 2 TABLET: 8.6; 5 TABLET ORAL at 21:26

## 2024-06-04 RX ADMIN — POLYETHYLENE GLYCOL (3350) 17 G: 17 POWDER, FOR SOLUTION ORAL at 07:56

## 2024-06-04 RX ADMIN — Medication 250 MG: at 12:07

## 2024-06-04 RX ADMIN — TORSEMIDE 20 MG: 20 TABLET ORAL at 07:56

## 2024-06-04 RX ADMIN — ACETAMINOPHEN 1000 MG: 500 TABLET ORAL at 05:31

## 2024-06-04 RX ADMIN — MAGNESIUM HYDROXIDE 30 ML: 400 SUSPENSION ORAL at 12:07

## 2024-06-04 RX ADMIN — ACETAMINOPHEN 1000 MG: 500 TABLET ORAL at 21:26

## 2024-06-04 RX ADMIN — ACETAMINOPHEN 1000 MG: 500 TABLET ORAL at 13:26

## 2024-06-04 RX ADMIN — METOPROLOL TARTRATE 25 MG: 25 TABLET, FILM COATED ORAL at 07:56

## 2024-06-04 RX ADMIN — TIZANIDINE 2 MG: 2 TABLET ORAL at 15:52

## 2024-06-04 RX ADMIN — BISACODYL 10 MG: 10 SUPPOSITORY RECTAL at 22:24

## 2024-06-04 RX ADMIN — ENOXAPARIN SODIUM 30 MG: 100 INJECTION SUBCUTANEOUS at 07:56

## 2024-06-04 RX ADMIN — POTASSIUM CHLORIDE 40 MEQ: 1500 TABLET, EXTENDED RELEASE ORAL at 07:56

## 2024-06-04 RX ADMIN — EPLERENONE 25 MG: 25 TABLET, FILM COATED ORAL at 07:56

## 2024-06-04 RX ADMIN — METOPROLOL TARTRATE 25 MG: 25 TABLET, FILM COATED ORAL at 21:26

## 2024-06-04 RX ADMIN — TRAMADOL HYDROCHLORIDE 50 MG: 50 TABLET, COATED ORAL at 13:48

## 2024-06-04 RX ADMIN — POTASSIUM CHLORIDE 40 MEQ: 1500 TABLET, EXTENDED RELEASE ORAL at 15:53

## 2024-06-04 ASSESSMENT — ENCOUNTER SYMPTOMS
PAIN SEVERITY NOW: 0
FEVER: 0
SHORTNESS OF BREATH: 0
CHILLS: 0
WEAKNESS: 1
BLURRED VISION: 0

## 2024-06-04 ASSESSMENT — PAIN SCALES - GENERAL
PAINLEVEL_OUTOF10: 2
PAINLEVEL_OUTOF10: 3
PAINLEVEL_OUTOF10: 2
PAINLEVEL_OUTOF10: 5
PAINLEVEL_OUTOF10: 3

## 2024-06-04 ASSESSMENT — COGNITIVE AND FUNCTIONAL STATUS - GENERAL
HELP NEEDED DRESSING REGULAR LOWER BODY CLOTHING: TOTAL
HELP NEEDED FOR TOILETING: A LOT
DAILY_ACTIVITY_CONVERTED_SCORE: 37.26
DAILY_ACTIVITY_RAW_SCORE: 17
HELP NEEDED FOR BATHING: A LOT

## 2024-06-04 ASSESSMENT — ACTIVITIES OF DAILY LIVING (ADL): HOME_MANAGEMENT_TIME_ENTRY: 15

## 2024-06-05 VITALS
OXYGEN SATURATION: 98 % | DIASTOLIC BLOOD PRESSURE: 67 MMHG | BODY MASS INDEX: 28.07 KG/M2 | TEMPERATURE: 97.5 F | BODY MASS INDEX: 28.07 KG/M2 | HEART RATE: 64 BPM | WEIGHT: 207.23 LBS | HEIGHT: 72 IN | WEIGHT: 207.23 LBS | SYSTOLIC BLOOD PRESSURE: 112 MMHG | TEMPERATURE: 97.5 F | SYSTOLIC BLOOD PRESSURE: 112 MMHG | DIASTOLIC BLOOD PRESSURE: 67 MMHG | HEIGHT: 72 IN | HEART RATE: 64 BPM | OXYGEN SATURATION: 98 % | RESPIRATION RATE: 16 BRPM | RESPIRATION RATE: 16 BRPM

## 2024-06-05 LAB
ANION GAP SERPL CALC-SCNC: 7 MMOL/L (ref 7–19)
ANION GAP SERPL CALC-SCNC: 7 MMOL/L (ref 7–19)
BASOPHILS # BLD: 0 K/MCL (ref 0–0.3)
BASOPHILS # BLD: 0 K/MCL (ref 0–0.3)
BASOPHILS NFR BLD: 0 %
BASOPHILS NFR BLD: 0 %
BUN SERPL-MCNC: 20 MG/DL (ref 6–20)
BUN SERPL-MCNC: 20 MG/DL (ref 6–20)
BUN/CREAT SERPL: 22 (ref 7–25)
BUN/CREAT SERPL: 22 (ref 7–25)
CALCIUM SERPL-MCNC: 9 MG/DL (ref 8.4–10.2)
CALCIUM SERPL-MCNC: 9 MG/DL (ref 8.4–10.2)
CHLORIDE SERPL-SCNC: 100 MMOL/L (ref 97–110)
CHLORIDE SERPL-SCNC: 100 MMOL/L (ref 97–110)
CO2 SERPL-SCNC: 31 MMOL/L (ref 21–32)
CO2 SERPL-SCNC: 31 MMOL/L (ref 21–32)
CREAT SERPL-MCNC: 0.91 MG/DL (ref 0.67–1.17)
CREAT SERPL-MCNC: 0.91 MG/DL (ref 0.67–1.17)
DEPRECATED RDW RBC: 41.9 FL (ref 39–50)
DEPRECATED RDW RBC: 41.9 FL (ref 39–50)
EGFRCR SERPLBLD CKD-EPI 2021: 84 ML/MIN/{1.73_M2}
EGFRCR SERPLBLD CKD-EPI 2021: 84 ML/MIN/{1.73_M2}
EOSINOPHIL # BLD: 0.2 K/MCL (ref 0–0.5)
EOSINOPHIL # BLD: 0.2 K/MCL (ref 0–0.5)
EOSINOPHIL NFR BLD: 2 %
EOSINOPHIL NFR BLD: 2 %
ERYTHROCYTE [DISTWIDTH] IN BLOOD: 12.8 % (ref 11–15)
ERYTHROCYTE [DISTWIDTH] IN BLOOD: 12.8 % (ref 11–15)
FASTING DURATION TIME PATIENT: ABNORMAL H
FASTING DURATION TIME PATIENT: ABNORMAL H
GLUCOSE SERPL-MCNC: 112 MG/DL (ref 70–99)
GLUCOSE SERPL-MCNC: 112 MG/DL (ref 70–99)
HCT VFR BLD CALC: 27.2 % (ref 39–51)
HCT VFR BLD CALC: 27.2 % (ref 39–51)
HGB BLD-MCNC: 8.8 G/DL (ref 13–17)
HGB BLD-MCNC: 8.8 G/DL (ref 13–17)
IMM GRANULOCYTES # BLD AUTO: 0.1 K/MCL (ref 0–0.2)
IMM GRANULOCYTES # BLD AUTO: 0.1 K/MCL (ref 0–0.2)
IMM GRANULOCYTES # BLD: 1 %
IMM GRANULOCYTES # BLD: 1 %
LYMPHOCYTES # BLD: 1.6 K/MCL (ref 1–4)
LYMPHOCYTES # BLD: 1.6 K/MCL (ref 1–4)
LYMPHOCYTES NFR BLD: 15 %
LYMPHOCYTES NFR BLD: 15 %
MCH RBC QN AUTO: 29.4 PG (ref 26–34)
MCH RBC QN AUTO: 29.4 PG (ref 26–34)
MCHC RBC AUTO-ENTMCNC: 32.4 G/DL (ref 32–36.5)
MCHC RBC AUTO-ENTMCNC: 32.4 G/DL (ref 32–36.5)
MCV RBC AUTO: 91 FL (ref 78–100)
MCV RBC AUTO: 91 FL (ref 78–100)
MONOCYTES # BLD: 1.1 K/MCL (ref 0.3–0.9)
MONOCYTES # BLD: 1.1 K/MCL (ref 0.3–0.9)
MONOCYTES NFR BLD: 10 %
MONOCYTES NFR BLD: 10 %
NEUTROPHILS # BLD: 8.1 K/MCL (ref 1.8–7.7)
NEUTROPHILS # BLD: 8.1 K/MCL (ref 1.8–7.7)
NEUTROPHILS NFR BLD: 72 %
NEUTROPHILS NFR BLD: 72 %
NRBC BLD MANUAL-RTO: 0 /100 WBC
NRBC BLD MANUAL-RTO: 0 /100 WBC
PHOSPHATE SERPL-MCNC: 2.8 MG/DL (ref 2.4–4.7)
PHOSPHATE SERPL-MCNC: 2.8 MG/DL (ref 2.4–4.7)
PLATELET # BLD AUTO: 257 K/MCL (ref 140–450)
PLATELET # BLD AUTO: 257 K/MCL (ref 140–450)
POTASSIUM SERPL-SCNC: 3.5 MMOL/L (ref 3.4–5.1)
POTASSIUM SERPL-SCNC: 3.5 MMOL/L (ref 3.4–5.1)
POTASSIUM SERPL-SCNC: 3.7 MMOL/L (ref 3.4–5.1)
POTASSIUM SERPL-SCNC: 3.7 MMOL/L (ref 3.4–5.1)
RBC # BLD: 2.99 MIL/MCL (ref 4.5–5.9)
RBC # BLD: 2.99 MIL/MCL (ref 4.5–5.9)
SODIUM SERPL-SCNC: 134 MMOL/L (ref 135–145)
SODIUM SERPL-SCNC: 134 MMOL/L (ref 135–145)
WBC # BLD: 11.2 K/MCL (ref 4.2–11)
WBC # BLD: 11.2 K/MCL (ref 4.2–11)

## 2024-06-05 PROCEDURE — 10004651 HB RX, NO CHARGE ITEM: Performed by: ORTHOPAEDIC SURGERY

## 2024-06-05 PROCEDURE — 10002803 HB RX 637: Performed by: INTERNAL MEDICINE

## 2024-06-05 PROCEDURE — 99239 HOSP IP/OBS DSCHRG MGMT >30: CPT | Performed by: NURSE PRACTITIONER

## 2024-06-05 PROCEDURE — 10002803 HB RX 637: Performed by: SURGERY

## 2024-06-05 PROCEDURE — 96372 THER/PROPH/DIAG INJ SC/IM: CPT

## 2024-06-05 PROCEDURE — 85025 COMPLETE CBC W/AUTO DIFF WBC: CPT | Performed by: NURSE PRACTITIONER

## 2024-06-05 PROCEDURE — 10002803 HB RX 637

## 2024-06-05 PROCEDURE — 97530 THERAPEUTIC ACTIVITIES: CPT

## 2024-06-05 PROCEDURE — 80048 BASIC METABOLIC PNL TOTAL CA: CPT | Performed by: NURSE PRACTITIONER

## 2024-06-05 PROCEDURE — 10002800 HB RX 250 W HCPCS

## 2024-06-05 PROCEDURE — 36415 COLL VENOUS BLD VENIPUNCTURE: CPT | Performed by: NURSE PRACTITIONER

## 2024-06-05 PROCEDURE — 84100 ASSAY OF PHOSPHORUS: CPT | Performed by: SURGERY

## 2024-06-05 PROCEDURE — 84132 ASSAY OF SERUM POTASSIUM: CPT | Performed by: SURGERY

## 2024-06-05 PROCEDURE — 97535 SELF CARE MNGMENT TRAINING: CPT

## 2024-06-05 PROCEDURE — 97110 THERAPEUTIC EXERCISES: CPT

## 2024-06-05 RX ORDER — BISACODYL 10 MG
10 SUPPOSITORY, RECTAL RECTAL DAILY PRN
Status: SHIPPED | COMMUNITY
Start: 2024-06-05

## 2024-06-05 RX ORDER — POTASSIUM CHLORIDE 20 MEQ/1
40 TABLET, EXTENDED RELEASE ORAL ONCE
Status: COMPLETED | OUTPATIENT
Start: 2024-06-05 | End: 2024-06-05

## 2024-06-05 RX ORDER — AMOXICILLIN 250 MG
2 CAPSULE ORAL NIGHTLY
Status: SHIPPED | COMMUNITY
Start: 2024-06-05

## 2024-06-05 RX ORDER — NALOXONE HYDROCHLORIDE 4 MG/.1ML
SPRAY NASAL
Qty: 2 EACH | Refills: 1 | Status: SHIPPED
Start: 2024-06-05

## 2024-06-05 RX ORDER — TRAMADOL HYDROCHLORIDE 50 MG/1
50 TABLET ORAL EVERY 6 HOURS PRN
Qty: 20 TABLET | Refills: 0 | Status: SHIPPED
Start: 2024-06-05

## 2024-06-05 RX ORDER — ACETAMINOPHEN 500 MG
1000 TABLET ORAL EVERY 8 HOURS
Status: SHIPPED | COMMUNITY
Start: 2024-06-05

## 2024-06-05 RX ORDER — POLYETHYLENE GLYCOL 3350 17 G/17G
17 POWDER, FOR SOLUTION ORAL DAILY
Status: SHIPPED | COMMUNITY
Start: 2024-06-06

## 2024-06-05 RX ORDER — ENOXAPARIN SODIUM 100 MG/ML
30 INJECTION SUBCUTANEOUS EVERY 12 HOURS
Qty: 8.4 ML | Refills: 0 | Status: SHIPPED | OUTPATIENT
Start: 2024-06-05 | End: 2024-06-19

## 2024-06-05 RX ORDER — TIZANIDINE 2 MG/1
2 TABLET ORAL EVERY 8 HOURS PRN
Qty: 20 TABLET | Refills: 0 | Status: SHIPPED
Start: 2024-06-05

## 2024-06-05 RX ADMIN — SODIUM CHLORIDE, PRESERVATIVE FREE 2 ML: 5 INJECTION INTRAVENOUS at 09:32

## 2024-06-05 RX ADMIN — ESCITALOPRAM OXALATE 10 MG: 10 TABLET ORAL at 09:31

## 2024-06-05 RX ADMIN — TRAMADOL HYDROCHLORIDE 50 MG: 50 TABLET, COATED ORAL at 09:31

## 2024-06-05 RX ADMIN — EPLERENONE 25 MG: 25 TABLET, FILM COATED ORAL at 09:30

## 2024-06-05 RX ADMIN — POLYETHYLENE GLYCOL (3350) 17 G: 17 POWDER, FOR SOLUTION ORAL at 09:30

## 2024-06-05 RX ADMIN — TORSEMIDE 20 MG: 20 TABLET ORAL at 09:30

## 2024-06-05 RX ADMIN — POTASSIUM CHLORIDE 40 MEQ: 1500 TABLET, EXTENDED RELEASE ORAL at 09:30

## 2024-06-05 RX ADMIN — ENOXAPARIN SODIUM 30 MG: 100 INJECTION SUBCUTANEOUS at 09:30

## 2024-06-05 RX ADMIN — METOPROLOL TARTRATE 25 MG: 25 TABLET, FILM COATED ORAL at 09:30

## 2024-06-05 ASSESSMENT — ENCOUNTER SYMPTOMS
PAIN SEVERITY NOW: 2
PAIN SEVERITY NOW: 5

## 2024-06-05 ASSESSMENT — COGNITIVE AND FUNCTIONAL STATUS - GENERAL
HELP NEEDED DRESSING REGULAR LOWER BODY CLOTHING: A LOT
DAILY_ACTIVITY_RAW_SCORE: 18
HELP NEEDED FOR TOILETING: A LOT
BASIC_MOBILITY_RAW_SCORE: 14
HELP NEEDED FOR BATHING: A LOT
BASIC_MOBILITY_CONVERTED_SCORE: 35.55
DAILY_ACTIVITY_CONVERTED_SCORE: 38.66

## 2024-06-05 ASSESSMENT — PAIN SCALES - GENERAL: PAINLEVEL_OUTOF10: 3

## 2024-06-05 ASSESSMENT — ACTIVITIES OF DAILY LIVING (ADL): HOME_MANAGEMENT_TIME_ENTRY: 35

## 2024-06-14 ENCOUNTER — TELEPHONE (OUTPATIENT)
Dept: ORTHOPEDICS | Age: 84
End: 2024-06-14

## 2024-06-18 ENCOUNTER — APPOINTMENT (OUTPATIENT)
Dept: ORTHOPEDICS | Age: 84
End: 2024-06-18

## 2024-06-18 ENCOUNTER — IMAGING SERVICES (OUTPATIENT)
Dept: GENERAL RADIOLOGY | Age: 84
End: 2024-06-18

## 2024-06-18 DIAGNOSIS — S72.402D CLOSED FRACTURE OF DISTAL END OF LEFT FEMUR WITH ROUTINE HEALING, UNSPECIFIED FRACTURE MORPHOLOGY, SUBSEQUENT ENCOUNTER: ICD-10-CM

## 2024-06-18 DIAGNOSIS — S72.402D CLOSED FRACTURE OF DISTAL END OF LEFT FEMUR WITH ROUTINE HEALING, UNSPECIFIED FRACTURE MORPHOLOGY, SUBSEQUENT ENCOUNTER: Primary | ICD-10-CM

## 2024-06-18 PROCEDURE — 73560 X-RAY EXAM OF KNEE 1 OR 2: CPT | Performed by: RADIOLOGY

## 2024-06-18 PROCEDURE — 99024 POSTOP FOLLOW-UP VISIT: CPT

## 2024-06-18 PROCEDURE — 73552 X-RAY EXAM OF FEMUR 2/>: CPT | Performed by: RADIOLOGY

## 2024-07-03 ENCOUNTER — OFFICE VISIT (OUTPATIENT)
Age: 84
End: 2024-07-03

## 2024-07-03 VITALS — SYSTOLIC BLOOD PRESSURE: 115 MMHG | DIASTOLIC BLOOD PRESSURE: 67 MMHG | HEART RATE: 59 BPM

## 2024-07-03 DIAGNOSIS — S25.102D INJURY OF LEFT SUBCLAVIAN ARTERY, SUBSEQUENT ENCOUNTER: Primary | ICD-10-CM

## 2024-07-03 RX ORDER — APIXABAN 5 MG/1
5 TABLET, FILM COATED ORAL 2 TIMES DAILY
COMMUNITY
Start: 2024-06-09

## 2024-07-03 RX ORDER — HYDROCODONE BITARTRATE AND ACETAMINOPHEN 10; 325 MG/1; MG/1
1 TABLET ORAL 2 TIMES DAILY PRN
COMMUNITY
Start: 2024-06-24

## 2024-07-03 ASSESSMENT — PATIENT HEALTH QUESTIONNAIRE - PHQ9
2. FEELING DOWN, DEPRESSED OR HOPELESS: NOT AT ALL
SUM OF ALL RESPONSES TO PHQ9 QUESTIONS 1 AND 2: 0
CLINICAL INTERPRETATION OF PHQ2 SCORE: NO FURTHER SCREENING NEEDED
1. LITTLE INTEREST OR PLEASURE IN DOING THINGS: NOT AT ALL
SUM OF ALL RESPONSES TO PHQ9 QUESTIONS 1 AND 2: 0

## 2024-07-05 ENCOUNTER — HOSPITAL ENCOUNTER (OUTPATIENT)
Dept: CT IMAGING | Age: 84
End: 2024-07-05
Attending: SURGERY

## 2024-07-05 DIAGNOSIS — S25.102D INJURY OF LEFT SUBCLAVIAN ARTERY, SUBSEQUENT ENCOUNTER: ICD-10-CM

## 2024-07-05 PROCEDURE — 10002805 HB CONTRAST AGENT: Performed by: SURGERY

## 2024-07-05 PROCEDURE — 71275 CT ANGIOGRAPHY CHEST: CPT

## 2024-07-05 RX ADMIN — IOHEXOL 80 ML: 350 INJECTION, SOLUTION INTRAVENOUS at 15:37

## 2024-07-11 ENCOUNTER — TELEPHONE (OUTPATIENT)
Dept: CARDIOLOGY | Age: 84
End: 2024-07-11

## 2024-07-16 ENCOUNTER — IMAGING SERVICES (OUTPATIENT)
Dept: GENERAL RADIOLOGY | Age: 84
End: 2024-07-16

## 2024-07-16 ENCOUNTER — APPOINTMENT (OUTPATIENT)
Dept: ORTHOPEDICS | Age: 84
End: 2024-07-16

## 2024-07-16 DIAGNOSIS — S72.402D CLOSED FRACTURE OF DISTAL END OF LEFT FEMUR WITH ROUTINE HEALING, UNSPECIFIED FRACTURE MORPHOLOGY, SUBSEQUENT ENCOUNTER: ICD-10-CM

## 2024-07-16 DIAGNOSIS — S72.402D CLOSED FRACTURE OF DISTAL END OF LEFT FEMUR WITH ROUTINE HEALING, UNSPECIFIED FRACTURE MORPHOLOGY, SUBSEQUENT ENCOUNTER: Primary | ICD-10-CM

## 2024-07-16 PROCEDURE — 73552 X-RAY EXAM OF FEMUR 2/>: CPT | Performed by: RADIOLOGY

## 2024-07-16 PROCEDURE — 73560 X-RAY EXAM OF KNEE 1 OR 2: CPT | Performed by: RADIOLOGY

## 2024-07-16 PROCEDURE — 99024 POSTOP FOLLOW-UP VISIT: CPT

## 2024-07-29 ENCOUNTER — TELEPHONE (OUTPATIENT)
Dept: ORTHOPEDICS | Age: 84
End: 2024-07-29

## 2024-08-14 ENCOUNTER — TELEPHONE (OUTPATIENT)
Dept: ORTHOPEDICS | Age: 84
End: 2024-08-14

## 2024-08-24 ENCOUNTER — APPOINTMENT (OUTPATIENT)
Dept: GENERAL RADIOLOGY | Age: 84
DRG: 556 | End: 2024-08-24
Attending: EMERGENCY MEDICINE

## 2024-08-24 ENCOUNTER — HOSPITAL ENCOUNTER (INPATIENT)
Age: 84
LOS: 1 days | Discharge: HOME | DRG: 556 | End: 2024-08-25
Attending: EMERGENCY MEDICINE | Admitting: FAMILY MEDICINE

## 2024-08-24 DIAGNOSIS — M25.462 PAIN AND SWELLING OF LEFT KNEE: Primary | ICD-10-CM

## 2024-08-24 DIAGNOSIS — M25.562 PAIN AND SWELLING OF LEFT KNEE: Primary | ICD-10-CM

## 2024-08-24 PROBLEM — M00.9 INFECTION OF LEFT KNEE  (CMD): Status: ACTIVE | Noted: 2024-08-24

## 2024-08-24 LAB
ALBUMIN SERPL-MCNC: 3.6 G/DL (ref 3.6–5.1)
ALBUMIN/GLOB SERPL: 1 {RATIO} (ref 1–2.4)
ALP SERPL-CCNC: 259 UNITS/L (ref 45–117)
ALT SERPL-CCNC: 17 UNITS/L
ANION GAP SERPL CALC-SCNC: 10 MMOL/L (ref 7–19)
APTT PPP: 37 SEC (ref 22–32)
AST SERPL-CCNC: 17 UNITS/L
BASOPHILS # BLD: 0 K/MCL (ref 0–0.3)
BASOPHILS NFR BLD: 0 %
BILIRUB SERPL-MCNC: 0.3 MG/DL (ref 0.2–1)
BUN SERPL-MCNC: 20 MG/DL (ref 6–20)
BUN/CREAT SERPL: 18 (ref 7–25)
CALCIUM SERPL-MCNC: 9 MG/DL (ref 8.4–10.2)
CHLORIDE SERPL-SCNC: 105 MMOL/L (ref 97–110)
CO2 SERPL-SCNC: 27 MMOL/L (ref 21–32)
CREAT SERPL-MCNC: 1.14 MG/DL (ref 0.67–1.17)
CRP SERPL-MCNC: 10.5 MG/L
DEPRECATED RDW RBC: 45 FL (ref 39–50)
EGFRCR SERPLBLD CKD-EPI 2021: 64 ML/MIN/{1.73_M2}
EOSINOPHIL # BLD: 0.3 K/MCL (ref 0–0.5)
EOSINOPHIL NFR BLD: 2 %
ERYTHROCYTE [DISTWIDTH] IN BLOOD: 14 % (ref 11–15)
ERYTHROCYTE [SEDIMENTATION RATE] IN BLOOD BY WESTERGREN METHOD: 31 MM/HR (ref 0–20)
FASTING DURATION TIME PATIENT: ABNORMAL H
GLOBULIN SER-MCNC: 3.6 G/DL (ref 2–4)
GLUCOSE SERPL-MCNC: 97 MG/DL (ref 70–99)
HCT VFR BLD CALC: 38.1 % (ref 39–51)
HGB BLD-MCNC: 11.8 G/DL (ref 13–17)
IMM GRANULOCYTES # BLD AUTO: 0 K/MCL (ref 0–0.2)
IMM GRANULOCYTES # BLD: 0 %
INR PPP: 1.1
LACTATE BLDV-SCNC: 0.9 MMOL/L (ref 0–2)
LYMPHOCYTES # BLD: 2.7 K/MCL (ref 1–4)
LYMPHOCYTES NFR BLD: 21 %
MCH RBC QN AUTO: 27.2 PG (ref 26–34)
MCHC RBC AUTO-ENTMCNC: 31 G/DL (ref 32–36.5)
MCV RBC AUTO: 87.8 FL (ref 78–100)
MONOCYTES # BLD: 1.2 K/MCL (ref 0.3–0.9)
MONOCYTES NFR BLD: 9 %
NEUTROPHILS # BLD: 8.7 K/MCL (ref 1.8–7.7)
NEUTROPHILS NFR BLD: 68 %
NRBC BLD MANUAL-RTO: 0 /100 WBC
PLATELET # BLD AUTO: 363 K/MCL (ref 140–450)
POTASSIUM SERPL-SCNC: 4 MMOL/L (ref 3.4–5.1)
PROCALCITONIN SERPL IA-MCNC: 0.08 NG/ML
PROT SERPL-MCNC: 7.2 G/DL (ref 6.4–8.2)
PROTHROMBIN TIME: 11.4 SEC (ref 9.7–11.8)
RBC # BLD: 4.34 MIL/MCL (ref 4.5–5.9)
SODIUM SERPL-SCNC: 138 MMOL/L (ref 135–145)
WBC # BLD: 13 K/MCL (ref 4.2–11)

## 2024-08-24 PROCEDURE — 10000002 HB ROOM CHARGE MED SURG

## 2024-08-24 PROCEDURE — 10002800 HB RX 250 W HCPCS: Performed by: EMERGENCY MEDICINE

## 2024-08-24 PROCEDURE — 73562 X-RAY EXAM OF KNEE 3: CPT

## 2024-08-24 PROCEDURE — 86140 C-REACTIVE PROTEIN: CPT | Performed by: EMERGENCY MEDICINE

## 2024-08-24 PROCEDURE — 80053 COMPREHEN METABOLIC PANEL: CPT | Performed by: EMERGENCY MEDICINE

## 2024-08-24 PROCEDURE — 10002801 HB RX 250 W/O HCPCS: Performed by: EMERGENCY MEDICINE

## 2024-08-24 PROCEDURE — 85730 THROMBOPLASTIN TIME PARTIAL: CPT | Performed by: EMERGENCY MEDICINE

## 2024-08-24 PROCEDURE — 83605 ASSAY OF LACTIC ACID: CPT | Performed by: EMERGENCY MEDICINE

## 2024-08-24 PROCEDURE — 10002807 HB RX 258: Performed by: EMERGENCY MEDICINE

## 2024-08-24 PROCEDURE — 85025 COMPLETE CBC W/AUTO DIFF WBC: CPT | Performed by: EMERGENCY MEDICINE

## 2024-08-24 PROCEDURE — 85652 RBC SED RATE AUTOMATED: CPT | Performed by: EMERGENCY MEDICINE

## 2024-08-24 PROCEDURE — 99284 EMERGENCY DEPT VISIT MOD MDM: CPT

## 2024-08-24 PROCEDURE — 85610 PROTHROMBIN TIME: CPT | Performed by: EMERGENCY MEDICINE

## 2024-08-24 PROCEDURE — 84145 PROCALCITONIN (PCT): CPT | Performed by: EMERGENCY MEDICINE

## 2024-08-24 PROCEDURE — 87040 BLOOD CULTURE FOR BACTERIA: CPT | Performed by: EMERGENCY MEDICINE

## 2024-08-24 PROCEDURE — 10002803 HB RX 637: Performed by: EMERGENCY MEDICINE

## 2024-08-24 PROCEDURE — 36415 COLL VENOUS BLD VENIPUNCTURE: CPT

## 2024-08-24 RX ORDER — HYDROCODONE BITARTRATE AND ACETAMINOPHEN 5; 325 MG/1; MG/1
1 TABLET ORAL ONCE
Status: COMPLETED | OUTPATIENT
Start: 2024-08-24 | End: 2024-08-24

## 2024-08-24 RX ADMIN — CEFEPIME 1000 MG: 1 INJECTION, POWDER, FOR SOLUTION INTRAMUSCULAR; INTRAVENOUS at 22:21

## 2024-08-24 RX ADMIN — VANCOMYCIN HYDROCHLORIDE 2000 MG: 10 INJECTION, POWDER, LYOPHILIZED, FOR SOLUTION INTRAVENOUS at 23:37

## 2024-08-24 RX ADMIN — HYDROCODONE BITARTRATE AND ACETAMINOPHEN 1 TABLET: 5; 325 TABLET ORAL at 21:18

## 2024-08-24 SDOH — ECONOMIC STABILITY: HOUSING INSECURITY: WHAT IS YOUR LIVING SITUATION TODAY?: HOUSE

## 2024-08-24 SDOH — ECONOMIC STABILITY: INCOME INSECURITY: IN THE PAST 12 MONTHS, HAS THE ELECTRIC, GAS, OIL, OR WATER COMPANY THREATENED TO SHUT OFF SERVICE IN YOUR HOME?: NO

## 2024-08-24 SDOH — SOCIAL STABILITY: SOCIAL INSECURITY: HOW OFTEN DOES ANYONE, INCLUDING FAMILY AND FRIENDS, INSULT OR TALK DOWN TO YOU?: NEVER

## 2024-08-24 SDOH — ECONOMIC STABILITY: HOUSING INSECURITY: WHAT IS YOUR LIVING SITUATION TODAY?: I HAVE A STEADY PLACE TO LIVE

## 2024-08-24 SDOH — HEALTH STABILITY: PHYSICAL HEALTH: DO YOU HAVE SERIOUS DIFFICULTY WALKING OR CLIMBING STAIRS?: YES

## 2024-08-24 SDOH — SOCIAL STABILITY: SOCIAL INSECURITY: HOW OFTEN DOES ANYONE, INCLUDING FAMILY AND FRIENDS, SCREAM OR CURSE AT YOU?: NEVER

## 2024-08-24 SDOH — SOCIAL STABILITY: SOCIAL INSECURITY: HOW OFTEN DOES ANYONE, INCLUDING FAMILY AND FRIENDS, PHYSICALLY HURT YOU?: NEVER

## 2024-08-24 SDOH — ECONOMIC STABILITY: HOUSING INSECURITY: WHAT IS YOUR LIVING SITUATION TODAY?: ALONE

## 2024-08-24 SDOH — ECONOMIC STABILITY: HOUSING INSECURITY: DO YOU HAVE PROBLEMS WITH ANY OF THE FOLLOWING?: NONE OF THE ABOVE

## 2024-08-24 SDOH — ECONOMIC STABILITY: GENERAL

## 2024-08-24 SDOH — SOCIAL STABILITY: SOCIAL INSECURITY: HOW OFTEN DOES ANYONE, INCLUDING FAMILY AND FRIENDS, THREATEN YOU WITH HARM?: NEVER

## 2024-08-24 SDOH — HEALTH STABILITY: PHYSICAL HEALTH: DO YOU HAVE DIFFICULTY DRESSING OR BATHING?: NO

## 2024-08-24 SDOH — ECONOMIC STABILITY: FOOD INSECURITY: WITHIN THE PAST 12 MONTHS, THE FOOD YOU BOUGHT JUST DIDN'T LAST AND YOU DIDN'T HAVE MONEY TO GET MORE.: NEVER TRUE

## 2024-08-24 SDOH — HEALTH STABILITY: GENERAL
BECAUSE OF A PHYSICAL, MENTAL, OR EMOTIONAL CONDITION, DO YOU HAVE SERIOUS DIFFICULTY CONCENTRATING, REMEMBERING OR MAKING DECISIONS?: YES

## 2024-08-24 SDOH — HEALTH STABILITY: GENERAL: BECAUSE OF A PHYSICAL, MENTAL, OR EMOTIONAL CONDITION, DO YOU HAVE DIFFICULTY DOING ERRANDS ALONE?: NO

## 2024-08-24 SDOH — SOCIAL STABILITY: SOCIAL NETWORK: SUPPORT SYSTEMS: CHILDREN;FAMILY MEMBERS

## 2024-08-24 ASSESSMENT — ORIENTATION MEMORY CONCENTRATION TEST (OMCT)
WHAT YEAR IS IT NOW (MUST BE EXACT): CORRECT
WHAT TIME IS IT (NO WATCH OR CLOCK): CORRECT
COUNT BACKWARDS FROM 20 TO 1: CORRECT
OMCT SCORE: 0
OMCT INTERPRETATION: 0-6: NO SIGNIFICANT IMPAIRMENT
REPEAT THE NAME AND ADDRESS I ASKED YOU TO REMEMBER: CORRECT
SAY THE MONTHS IN REVERSE ORDER STARTING WITH LAST MONTH: CORRECT
WHAT MONTH IS IT NOW: CORRECT

## 2024-08-24 ASSESSMENT — ENCOUNTER SYMPTOMS
DIARRHEA: 0
LIGHT-HEADEDNESS: 0
CHILLS: 1
ABDOMINAL PAIN: 0
NAUSEA: 0
DIZZINESS: 0
FEVER: 1
VOMITING: 0
SHORTNESS OF BREATH: 0
FATIGUE: 1

## 2024-08-24 ASSESSMENT — LIFESTYLE VARIABLES
HOW OFTEN DO YOU HAVE 6 OR MORE DRINKS ON ONE OCCASION: NEVER
AUDIT-C TOTAL SCORE: 0
HOW OFTEN DO YOU HAVE A DRINK CONTAINING ALCOHOL: NEVER
HOW MANY STANDARD DRINKS CONTAINING ALCOHOL DO YOU HAVE ON A TYPICAL DAY: 0,1 OR 2
ALCOHOL_USE_STATUS: NO OR LOW RISK WITH VALIDATED TOOL

## 2024-08-24 ASSESSMENT — COLUMBIA-SUICIDE SEVERITY RATING SCALE - C-SSRS
1. WITHIN THE PAST MONTH, HAVE YOU WISHED YOU WERE DEAD OR WISHED YOU COULD GO TO SLEEP AND NOT WAKE UP?: NO
IS THE PATIENT ABLE TO COMPLETE C-SSRS: YES
2. HAVE YOU ACTUALLY HAD ANY THOUGHTS OF KILLING YOURSELF?: NO

## 2024-08-24 ASSESSMENT — ACTIVITIES OF DAILY LIVING (ADL)
BATHING: INDEPENDENT
ADL_BEFORE_ADMISSION: INDEPENDENT
TOILETING: INDEPENDENT
ADL_SCORE: 23
ADL_SHORT_OF_BREATH: NO
FEEDING: INDEPENDENT
RECENT_DECLINE_ADL: NO
DRESSING: INDEPENDENT

## 2024-08-24 ASSESSMENT — PAIN DESCRIPTION - PAIN TYPE: TYPE: ACUTE PAIN

## 2024-08-24 ASSESSMENT — PAIN SCALES - GENERAL
PAINLEVEL_OUTOF10: 2
PAINLEVEL_OUTOF10: 8
PAINLEVEL_OUTOF10: 3

## 2024-08-25 VITALS
BODY MASS INDEX: 28.28 KG/M2 | RESPIRATION RATE: 16 BRPM | SYSTOLIC BLOOD PRESSURE: 145 MMHG | OXYGEN SATURATION: 95 % | HEART RATE: 58 BPM | TEMPERATURE: 97.5 F | WEIGHT: 208.78 LBS | DIASTOLIC BLOOD PRESSURE: 73 MMHG | HEIGHT: 72 IN

## 2024-08-25 LAB
ALBUMIN SERPL-MCNC: 3 G/DL (ref 3.6–5.1)
ALBUMIN/GLOB SERPL: 0.9 {RATIO} (ref 1–2.4)
ALP SERPL-CCNC: 228 UNITS/L (ref 45–117)
ALT SERPL-CCNC: 15 UNITS/L
ANION GAP SERPL CALC-SCNC: 9 MMOL/L (ref 7–19)
AST SERPL-CCNC: 14 UNITS/L
BASOPHILS # BLD: 0 K/MCL (ref 0–0.3)
BASOPHILS NFR BLD: 0 %
BILIRUB SERPL-MCNC: 0.6 MG/DL (ref 0.2–1)
BUN SERPL-MCNC: 16 MG/DL (ref 6–20)
BUN/CREAT SERPL: 17 (ref 7–25)
CALCIUM SERPL-MCNC: 8.7 MG/DL (ref 8.4–10.2)
CHLORIDE SERPL-SCNC: 109 MMOL/L (ref 97–110)
CO2 SERPL-SCNC: 26 MMOL/L (ref 21–32)
CREAT SERPL-MCNC: 0.94 MG/DL (ref 0.67–1.17)
DEPRECATED RDW RBC: 44.8 FL (ref 39–50)
EGFRCR SERPLBLD CKD-EPI 2021: 81 ML/MIN/{1.73_M2}
EOSINOPHIL # BLD: 0.3 K/MCL (ref 0–0.5)
EOSINOPHIL NFR BLD: 3 %
ERYTHROCYTE [DISTWIDTH] IN BLOOD: 14 % (ref 11–15)
FASTING DURATION TIME PATIENT: ABNORMAL H
GLOBULIN SER-MCNC: 3.3 G/DL (ref 2–4)
GLUCOSE SERPL-MCNC: 95 MG/DL (ref 70–99)
HCT VFR BLD CALC: 34.6 % (ref 39–51)
HGB BLD-MCNC: 10.9 G/DL (ref 13–17)
IMM GRANULOCYTES # BLD AUTO: 0 K/MCL (ref 0–0.2)
IMM GRANULOCYTES # BLD: 0 %
LYMPHOCYTES # BLD: 2.3 K/MCL (ref 1–4)
LYMPHOCYTES NFR BLD: 23 %
MAGNESIUM SERPL-MCNC: 1.7 MG/DL (ref 1.7–2.4)
MCH RBC QN AUTO: 27.4 PG (ref 26–34)
MCHC RBC AUTO-ENTMCNC: 31.5 G/DL (ref 32–36.5)
MCV RBC AUTO: 86.9 FL (ref 78–100)
MONOCYTES # BLD: 0.9 K/MCL (ref 0.3–0.9)
MONOCYTES NFR BLD: 9 %
NEUTROPHILS # BLD: 6.5 K/MCL (ref 1.8–7.7)
NEUTROPHILS NFR BLD: 65 %
NRBC BLD MANUAL-RTO: 0 /100 WBC
PLATELET # BLD AUTO: 326 K/MCL (ref 140–450)
POTASSIUM SERPL-SCNC: 3.9 MMOL/L (ref 3.4–5.1)
PROT SERPL-MCNC: 6.3 G/DL (ref 6.4–8.2)
RAINBOW EXTRA TUBES HOLD SPECIMEN: NORMAL
RBC # BLD: 3.98 MIL/MCL (ref 4.5–5.9)
SODIUM SERPL-SCNC: 140 MMOL/L (ref 135–145)
WBC # BLD: 10 K/MCL (ref 4.2–11)

## 2024-08-25 PROCEDURE — 80053 COMPREHEN METABOLIC PANEL: CPT | Performed by: FAMILY MEDICINE

## 2024-08-25 PROCEDURE — 10002803 HB RX 637: Performed by: FAMILY MEDICINE

## 2024-08-25 PROCEDURE — 96372 THER/PROPH/DIAG INJ SC/IM: CPT | Performed by: FAMILY MEDICINE

## 2024-08-25 PROCEDURE — 10002807 HB RX 258: Performed by: INTERNAL MEDICINE

## 2024-08-25 PROCEDURE — 97530 THERAPEUTIC ACTIVITIES: CPT

## 2024-08-25 PROCEDURE — 97161 PT EVAL LOW COMPLEX 20 MIN: CPT

## 2024-08-25 PROCEDURE — 10004651 HB RX, NO CHARGE ITEM: Performed by: FAMILY MEDICINE

## 2024-08-25 PROCEDURE — 85025 COMPLETE CBC W/AUTO DIFF WBC: CPT | Performed by: FAMILY MEDICINE

## 2024-08-25 PROCEDURE — 36415 COLL VENOUS BLD VENIPUNCTURE: CPT | Performed by: FAMILY MEDICINE

## 2024-08-25 PROCEDURE — 10002800 HB RX 250 W HCPCS: Performed by: INTERNAL MEDICINE

## 2024-08-25 PROCEDURE — 83735 ASSAY OF MAGNESIUM: CPT | Performed by: FAMILY MEDICINE

## 2024-08-25 PROCEDURE — 99223 1ST HOSP IP/OBS HIGH 75: CPT | Performed by: ORTHOPAEDIC SURGERY

## 2024-08-25 PROCEDURE — 10002800 HB RX 250 W HCPCS: Performed by: FAMILY MEDICINE

## 2024-08-25 RX ORDER — POLYETHYLENE GLYCOL 3350 17 G/17G
17 POWDER, FOR SOLUTION ORAL DAILY PRN
Status: DISCONTINUED | OUTPATIENT
Start: 2024-08-25 | End: 2024-08-25 | Stop reason: HOSPADM

## 2024-08-25 RX ORDER — HEPARIN SODIUM 5000 [USP'U]/ML
5000 INJECTION, SOLUTION INTRAVENOUS; SUBCUTANEOUS EVERY 8 HOURS SCHEDULED
Status: DISCONTINUED | OUTPATIENT
Start: 2024-08-25 | End: 2024-08-25 | Stop reason: HOSPADM

## 2024-08-25 RX ORDER — CYCLOBENZAPRINE HCL 10 MG
10 TABLET ORAL 3 TIMES DAILY PRN
Status: DISCONTINUED | OUTPATIENT
Start: 2024-08-25 | End: 2024-08-25

## 2024-08-25 RX ORDER — ONDANSETRON 4 MG/1
4 TABLET, ORALLY DISINTEGRATING ORAL EVERY 6 HOURS PRN
Status: DISCONTINUED | OUTPATIENT
Start: 2024-08-25 | End: 2024-08-25 | Stop reason: HOSPADM

## 2024-08-25 RX ORDER — ACETAMINOPHEN 650 MG/1
650 SUPPOSITORY RECTAL EVERY 4 HOURS PRN
Status: DISCONTINUED | OUTPATIENT
Start: 2024-08-25 | End: 2024-08-25 | Stop reason: HOSPADM

## 2024-08-25 RX ORDER — 0.9 % SODIUM CHLORIDE 0.9 %
2 VIAL (ML) INJECTION EVERY 12 HOURS SCHEDULED
Status: DISCONTINUED | OUTPATIENT
Start: 2024-08-25 | End: 2024-08-25 | Stop reason: HOSPADM

## 2024-08-25 RX ORDER — LANOLIN ALCOHOL/MO/W.PET/CERES
400 CREAM (GRAM) TOPICAL ONCE
Status: DISCONTINUED | OUTPATIENT
Start: 2024-08-25 | End: 2024-08-25 | Stop reason: HOSPADM

## 2024-08-25 RX ORDER — ONDANSETRON 2 MG/ML
4 INJECTION INTRAMUSCULAR; INTRAVENOUS EVERY 12 HOURS PRN
Status: DISCONTINUED | OUTPATIENT
Start: 2024-08-25 | End: 2024-08-25 | Stop reason: HOSPADM

## 2024-08-25 RX ORDER — LANOLIN ALCOHOL/MO/W.PET/CERES
400 CREAM (GRAM) TOPICAL ONCE
Status: COMPLETED | OUTPATIENT
Start: 2024-08-25 | End: 2024-08-25

## 2024-08-25 RX ORDER — POTASSIUM CHLORIDE 1500 MG/1
40 TABLET, EXTENDED RELEASE ORAL ONCE
Status: COMPLETED | OUTPATIENT
Start: 2024-08-25 | End: 2024-08-25

## 2024-08-25 RX ORDER — CALCIUM CARBONATE 500 MG/1
500 TABLET, CHEWABLE ORAL EVERY 4 HOURS PRN
Status: DISCONTINUED | OUTPATIENT
Start: 2024-08-25 | End: 2024-08-25 | Stop reason: HOSPADM

## 2024-08-25 RX ORDER — CYCLOBENZAPRINE HCL 5 MG
5 TABLET ORAL 3 TIMES DAILY PRN
Status: DISCONTINUED | OUTPATIENT
Start: 2024-08-25 | End: 2024-08-25 | Stop reason: HOSPADM

## 2024-08-25 RX ORDER — POTASSIUM CHLORIDE 750 MG/1
10 TABLET, EXTENDED RELEASE ORAL DAILY
COMMUNITY

## 2024-08-25 RX ORDER — ACETAMINOPHEN 325 MG/1
650 TABLET ORAL EVERY 4 HOURS PRN
Status: DISCONTINUED | OUTPATIENT
Start: 2024-08-25 | End: 2024-08-25 | Stop reason: HOSPADM

## 2024-08-25 RX ORDER — METOPROLOL TARTRATE 25 MG/1
12.5 TABLET, FILM COATED ORAL 2 TIMES DAILY
Qty: 30 TABLET | Refills: 0 | Status: SHIPPED | OUTPATIENT
Start: 2024-08-25 | End: 2024-09-24

## 2024-08-25 RX ORDER — PANTOPRAZOLE SODIUM 40 MG/1
40 TABLET, DELAYED RELEASE ORAL DAILY
COMMUNITY

## 2024-08-25 RX ORDER — FAMOTIDINE 20 MG/1
20 TABLET, FILM COATED ORAL DAILY
Status: DISCONTINUED | OUTPATIENT
Start: 2024-08-25 | End: 2024-08-25 | Stop reason: HOSPADM

## 2024-08-25 RX ADMIN — SODIUM CHLORIDE, PRESERVATIVE FREE 2 ML: 5 INJECTION INTRAVENOUS at 09:08

## 2024-08-25 RX ADMIN — HEPARIN SODIUM 5000 UNITS: 5000 INJECTION INTRAVENOUS; SUBCUTANEOUS at 05:18

## 2024-08-25 RX ADMIN — VANCOMYCIN HYDROCHLORIDE 750 MG: 750 INJECTION, POWDER, LYOPHILIZED, FOR SOLUTION INTRAVENOUS at 10:42

## 2024-08-25 RX ADMIN — CYCLOBENZAPRINE HYDROCHLORIDE 10 MG: 10 TABLET, FILM COATED ORAL at 02:25

## 2024-08-25 RX ADMIN — FAMOTIDINE 20 MG: 20 TABLET ORAL at 09:06

## 2024-08-25 RX ADMIN — ACETAMINOPHEN 650 MG: 325 TABLET ORAL at 09:06

## 2024-08-25 RX ADMIN — SODIUM CHLORIDE, PRESERVATIVE FREE 2 ML: 5 INJECTION INTRAVENOUS at 00:39

## 2024-08-25 RX ADMIN — POTASSIUM CHLORIDE 40 MEQ: 1500 TABLET, EXTENDED RELEASE ORAL at 02:25

## 2024-08-25 RX ADMIN — Medication 400 MG: at 09:06

## 2024-08-25 RX ADMIN — ACETAMINOPHEN 650 MG: 325 TABLET ORAL at 05:17

## 2024-08-25 SDOH — ECONOMIC STABILITY: GENERAL: WOULD YOU LIKE HELP WITH ANY OF THE FOLLOWING NEEDS?: I DON'T WANT HELP WITH ANY OF THESE

## 2024-08-25 ASSESSMENT — PATIENT HEALTH QUESTIONNAIRE - PHQ9
CLINICAL INTERPRETATION OF PHQ2 SCORE: NO FURTHER SCREENING NEEDED
2. FEELING DOWN, DEPRESSED OR HOPELESS: NOT AT ALL
1. LITTLE INTEREST OR PLEASURE IN DOING THINGS: NOT AT ALL
IS PATIENT ABLE TO COMPLETE PHQ2 OR PHQ9: YES
SUM OF ALL RESPONSES TO PHQ9 QUESTIONS 1 AND 2: 0
SUM OF ALL RESPONSES TO PHQ9 QUESTIONS 1 AND 2: 0

## 2024-08-25 ASSESSMENT — ACTIVITIES OF DAILY LIVING (ADL)
PRIOR_ADL_TOILETING: INDEPENDENT
PRIOR_ADL: INDEPENDENT
EATING: INDEPENDENT
PRIOR_ADL_BATHING: INDEPENDENT

## 2024-08-25 ASSESSMENT — PAIN SCALES - GENERAL
PAINLEVEL_OUTOF10: 0
PAINLEVEL_OUTOF10: 1
PAINLEVEL_OUTOF10: 3

## 2024-08-25 ASSESSMENT — COGNITIVE AND FUNCTIONAL STATUS - GENERAL
BASIC_MOBILITY_RAW_SCORE: 23
BASIC_MOBILITY_CONVERTED_SCORE: 50.88

## 2024-08-27 ENCOUNTER — TELEPHONE (OUTPATIENT)
Dept: CARE COORDINATION | Age: 84
End: 2024-08-27

## 2024-08-29 LAB
BACTERIA BLD CULT: NORMAL
BACTERIA BLD CULT: NORMAL

## 2024-09-03 ENCOUNTER — TELEPHONE (OUTPATIENT)
Dept: CARE COORDINATION | Age: 84
End: 2024-09-03

## 2024-09-04 ENCOUNTER — TELEPHONE (OUTPATIENT)
Dept: CARE COORDINATION | Age: 84
End: 2024-09-04

## 2024-09-10 ENCOUNTER — TELEPHONE (OUTPATIENT)
Dept: CARE COORDINATION | Age: 84
End: 2024-09-10

## 2024-09-10 ENCOUNTER — IMAGING SERVICES (OUTPATIENT)
Dept: GENERAL RADIOLOGY | Age: 84
End: 2024-09-10
Attending: ORTHOPAEDIC SURGERY

## 2024-09-10 ENCOUNTER — OFFICE VISIT (OUTPATIENT)
Dept: ORTHOPEDICS | Age: 84
End: 2024-09-10

## 2024-09-10 DIAGNOSIS — S72.402D CLOSED FRACTURE OF DISTAL END OF LEFT FEMUR WITH ROUTINE HEALING, UNSPECIFIED FRACTURE MORPHOLOGY, SUBSEQUENT ENCOUNTER: ICD-10-CM

## 2024-09-10 DIAGNOSIS — S72.402D CLOSED FRACTURE OF DISTAL END OF LEFT FEMUR WITH ROUTINE HEALING, UNSPECIFIED FRACTURE MORPHOLOGY, SUBSEQUENT ENCOUNTER: Primary | ICD-10-CM

## 2024-09-10 PROCEDURE — 99213 OFFICE O/P EST LOW 20 MIN: CPT | Performed by: ORTHOPAEDIC SURGERY

## 2024-09-10 PROCEDURE — 73552 X-RAY EXAM OF FEMUR 2/>: CPT | Performed by: ORTHOPAEDIC SURGERY

## 2024-09-11 ENCOUNTER — TELEPHONE (OUTPATIENT)
Dept: ORTHOPEDICS | Age: 84
End: 2024-09-11

## 2024-09-11 DIAGNOSIS — S72.402D CLOSED FRACTURE OF DISTAL END OF LEFT FEMUR WITH ROUTINE HEALING, UNSPECIFIED FRACTURE MORPHOLOGY, SUBSEQUENT ENCOUNTER: Primary | ICD-10-CM

## 2024-11-18 ENCOUNTER — TELEPHONE (OUTPATIENT)
Dept: ORTHOPEDICS | Age: 84
End: 2024-11-18

## 2024-11-26 ENCOUNTER — TELEPHONE (OUTPATIENT)
Dept: ORTHOPEDICS | Age: 84
End: 2024-11-26

## 2024-12-03 ENCOUNTER — APPOINTMENT (OUTPATIENT)
Dept: ORTHOPEDICS | Age: 84
End: 2024-12-03

## 2024-12-03 ENCOUNTER — IMAGING SERVICES (OUTPATIENT)
Dept: GENERAL RADIOLOGY | Age: 84
End: 2024-12-03
Attending: ORTHOPAEDIC SURGERY

## 2024-12-03 DIAGNOSIS — S72.402D CLOSED FRACTURE OF DISTAL END OF LEFT FEMUR WITH ROUTINE HEALING, UNSPECIFIED FRACTURE MORPHOLOGY, SUBSEQUENT ENCOUNTER: ICD-10-CM

## 2024-12-03 DIAGNOSIS — S72.402D CLOSED FRACTURE OF DISTAL END OF LEFT FEMUR WITH ROUTINE HEALING, UNSPECIFIED FRACTURE MORPHOLOGY, SUBSEQUENT ENCOUNTER: Primary | ICD-10-CM

## 2024-12-03 PROCEDURE — 99213 OFFICE O/P EST LOW 20 MIN: CPT | Performed by: ORTHOPAEDIC SURGERY

## 2024-12-03 PROCEDURE — 73552 X-RAY EXAM OF FEMUR 2/>: CPT | Performed by: ORTHOPAEDIC SURGERY

## 2024-12-16 ENCOUNTER — TELEPHONE (OUTPATIENT)
Dept: ORTHOPEDICS | Age: 84
End: 2024-12-16

## 2025-03-04 ENCOUNTER — IMAGING SERVICES (OUTPATIENT)
Dept: GENERAL RADIOLOGY | Age: 85
End: 2025-03-04
Attending: ORTHOPAEDIC SURGERY

## 2025-03-04 ENCOUNTER — APPOINTMENT (OUTPATIENT)
Dept: ORTHOPEDICS | Age: 85
End: 2025-03-04

## 2025-03-04 VITALS — BODY MASS INDEX: 28.44 KG/M2 | HEIGHT: 72 IN | WEIGHT: 210 LBS

## 2025-03-04 DIAGNOSIS — S72.402D CLOSED FRACTURE OF DISTAL END OF LEFT FEMUR WITH ROUTINE HEALING, UNSPECIFIED FRACTURE MORPHOLOGY, SUBSEQUENT ENCOUNTER: ICD-10-CM

## 2025-03-04 DIAGNOSIS — S72.402D CLOSED FRACTURE OF DISTAL END OF LEFT FEMUR WITH ROUTINE HEALING, UNSPECIFIED FRACTURE MORPHOLOGY, SUBSEQUENT ENCOUNTER: Primary | ICD-10-CM

## 2025-03-04 PROCEDURE — 73552 X-RAY EXAM OF FEMUR 2/>: CPT | Performed by: ORTHOPAEDIC SURGERY

## 2025-03-04 PROCEDURE — 99214 OFFICE O/P EST MOD 30 MIN: CPT | Performed by: ORTHOPAEDIC SURGERY

## 2025-08-19 ENCOUNTER — APPOINTMENT (OUTPATIENT)
Dept: ORTHOPEDICS | Age: 85
End: 2025-08-19

## 2025-08-19 ENCOUNTER — IMAGING SERVICES (OUTPATIENT)
Dept: GENERAL RADIOLOGY | Age: 85
End: 2025-08-19
Attending: ORTHOPAEDIC SURGERY

## 2025-08-19 DIAGNOSIS — M17.32 POST-TRAUMATIC OSTEOARTHRITIS OF LEFT KNEE: ICD-10-CM

## 2025-08-19 DIAGNOSIS — S72.402D CLOSED FRACTURE OF DISTAL END OF LEFT FEMUR WITH ROUTINE HEALING, UNSPECIFIED FRACTURE MORPHOLOGY, SUBSEQUENT ENCOUNTER: ICD-10-CM

## 2025-08-19 DIAGNOSIS — S72.402D CLOSED FRACTURE OF DISTAL END OF LEFT FEMUR WITH ROUTINE HEALING, UNSPECIFIED FRACTURE MORPHOLOGY, SUBSEQUENT ENCOUNTER: Primary | ICD-10-CM

## 2025-08-19 PROCEDURE — 20610 DRAIN/INJ JOINT/BURSA W/O US: CPT | Performed by: ORTHOPAEDIC SURGERY

## 2025-08-19 PROCEDURE — 73552 X-RAY EXAM OF FEMUR 2/>: CPT | Performed by: ORTHOPAEDIC SURGERY

## 2025-08-19 PROCEDURE — 99214 OFFICE O/P EST MOD 30 MIN: CPT | Performed by: ORTHOPAEDIC SURGERY

## 2025-08-19 RX ADMIN — TRIAMCINOLONE ACETONIDE 40 MG: 40 INJECTION, SUSPENSION INTRA-ARTICULAR; INTRAMUSCULAR at 10:10

## 2025-08-19 RX ADMIN — LIDOCAINE HYDROCHLORIDE 4 ML: 10 INJECTION, SOLUTION INFILTRATION; PERINEURAL at 10:10

## 2025-08-25 RX ORDER — LIDOCAINE HYDROCHLORIDE 10 MG/ML
4 INJECTION, SOLUTION INFILTRATION; PERINEURAL
Status: COMPLETED | OUTPATIENT
Start: 2025-08-19 | End: 2025-08-19

## 2025-08-25 RX ORDER — TRIAMCINOLONE ACETONIDE 40 MG/ML
40 INJECTION, SUSPENSION INTRA-ARTICULAR; INTRAMUSCULAR
Status: COMPLETED | OUTPATIENT
Start: 2025-08-19 | End: 2025-08-19

## 2025-09-30 ENCOUNTER — APPOINTMENT (OUTPATIENT)
Dept: ORTHOPEDICS | Age: 85
End: 2025-09-30

## (undated) DEVICE — GLOVE SURG 7.5 PROTEXIS LF BLUE PF SMTH BEAD CUFF INTLK STRL

## (undated) DEVICE — GLOVE SURG 6.5 PROTEXIS LF CRM PF BEAD CUFF STRL PLISPRN

## (undated) DEVICE — DRAPE .75 SHT FNFLD 76X52IN SURG CNVRT STRL LF DISP TIBURON

## (undated) DEVICE — 2% CHLORHEXIDINE SKIN PREP ORANGE 26ML

## (undated) DEVICE — BANDAGE GAUZE BLK2 4.1YDX4.5IN 6 PLY OPEN WEAVE TIGHT FNSH

## (undated) DEVICE — HANDPIECE SCT MDVC YNKR BLBS TIP CLR STRL LF DISP

## (undated) DEVICE — GLOVE SURG 7.5 PROTEXIS LF CRM PF SMTH BEAD CUFF STRL

## (undated) DEVICE — DRAPE REINFORCE SPLIT ABS TUBE HLDR UNV 120X77IN SURG CNVRT

## (undated) DEVICE — DRAPE ADH STRIP SM TWL MATTE FNSH 17X11IN SURG STRDRP STRL

## (undated) DEVICE — GLOVE SURG 6 PROTEXIS LF BLUE PF SMTH BEAD CUFF INTLK STRL

## (undated) DEVICE — Device

## (undated) DEVICE — BLADE SURG 10 INDIV FOIL WRAP STD SCPL HNDL STRL PRSNA + SS

## (undated) DEVICE — GLOVE SURG 7 PROTEXIS LF CRM PF BEAD CUFF STRL PLISPRN 12IN

## (undated) DEVICE — SPONGE GAUZE 4X4IN CTN 12 PLY STRL CURITY

## (undated) DEVICE — SOLUTION PREP 70% ISO ALC 4OZ LF

## (undated) DEVICE — SUTURE VICRYL 2-0 CT1 27IN BRAID COAT ABS UNDYED J259H

## (undated) DEVICE — WRAP CMPR 5YDX4IN COBAN POR SLFADH ELASTIC LTWT HAND TEAR LF

## (undated) DEVICE — GLOVE SURG 6 PROTEXIS LF CRM PF BEAD CUFF STRL PLISPRN 11.5

## (undated) DEVICE — DRAPE U STRIP IMPRV ADH SPLIT 72X60IN 21X6IN SURG CNVRT STRL

## (undated) DEVICE — BLADE SURG 15 STRL PRSNA + PLMR

## (undated) DEVICE — GOWN SURG LG L3 NONREINFORCE SET IN SLV STRL LF DISP BLUE

## (undated) DEVICE — DRAPE FTSWTCH CVR X RAY TUBE FLAT PNL OEC MBL 33323 31CM C

## (undated) DEVICE — DRAPE EXPAND CLPSBL C ARM FLRSCP EQUIPMENT C-ARMOR STRL

## (undated) DEVICE — BANDAGE CMPR VELCLOSE 5YDX6IN HOOK SLFCLSR KNIT ELASTIC MED

## (undated) DEVICE — TOWEL OR BLU 16X26IN 4PK

## (undated) DEVICE — GOWN SURG XL L4 RAGLAN SLV BRTHBL STRL LF DISP SMARTGOWN

## (undated) DEVICE — SUTURE VICRYL 1 CT1 27IN BRAID COAT ABS UNDYED J261H

## (undated) DEVICE — BIT DRILL 4.3MM 315MM CALIBRATE AO AXSOS DIST LAT FEM NS

## (undated) DEVICE — GUIDEWIRE FIXOS 1.4MM 150MM ORTHO NS LF DISP

## (undated) DEVICE — GLOVE SURG 8 PROTEXIS LF BLUE PF SMTH BEAD CUFF INTLK STRL

## (undated) DEVICE — GLOVE SURG 8 PROTEXIS LF CRM PF BEAD CUFF STRL PLISPRN 12IN

## (undated) DEVICE — STAPLER SKIN 3.9X6.9MM WIDE RECT 35 CNT ROTATE HEAD RCHT

## (undated) DEVICE — BIT DRILL 3.2MM 315MM CLBRT AO FIT

## (undated) DEVICE — DRESSING PETRO 9X5IN NADH OCL IMPREGNATE CRD XEROFORM CTN

## (undated) DEVICE — SUTURE VICRYL 0 CT1 27IN BRAID COAT ABS UNDYED J260H